# Patient Record
Sex: FEMALE | Race: WHITE
[De-identification: names, ages, dates, MRNs, and addresses within clinical notes are randomized per-mention and may not be internally consistent; named-entity substitution may affect disease eponyms.]

---

## 2017-01-20 ENCOUNTER — HOSPITAL ENCOUNTER (OUTPATIENT)
Dept: HOSPITAL 53 - M SFHCSACK | Age: 67
End: 2017-01-20
Attending: PHYSICIAN ASSISTANT
Payer: MEDICARE

## 2017-01-20 DIAGNOSIS — E55.9: ICD-10-CM

## 2017-01-20 DIAGNOSIS — D69.6: Primary | ICD-10-CM

## 2017-01-20 DIAGNOSIS — I10: ICD-10-CM

## 2017-01-20 DIAGNOSIS — E11.21: ICD-10-CM

## 2017-01-20 LAB
ALBUMIN SERPL BCG-MCNC: 3.3 GM/DL (ref 3.2–5.2)
ALBUMIN/GLOB SERPL: 1.03 {RATIO} (ref 1–1.93)
ALP SERPL-CCNC: 97 U/L (ref 45–117)
ALT SERPL W P-5'-P-CCNC: 19 U/L (ref 12–78)
ANION GAP SERPL CALC-SCNC: 8 MEQ/L (ref 8–16)
AST SERPL-CCNC: 18 U/L (ref 15–37)
BILIRUB SERPL-MCNC: 0.3 MG/DL (ref 0.2–1)
BUN SERPL-MCNC: 36 MG/DL (ref 7–18)
CALCIUM SERPL-MCNC: 8.5 MG/DL (ref 8.8–10.2)
CHLORIDE SERPL-SCNC: 111 MEQ/L (ref 98–107)
CHOLEST SERPL-MCNC: 133 MG/DL (ref ?–200)
CO2 SERPL-SCNC: 29 MEQ/L (ref 21–32)
CREAT SERPL-MCNC: 2.18 MG/DL (ref 0.55–1.02)
ERYTHROCYTE [DISTWIDTH] IN BLOOD BY AUTOMATED COUNT: 15.5 % (ref 11.5–14.5)
EST. AVERAGE GLUCOSE BLD GHB EST-MCNC: 157 MG/DL (ref 60–110)
GFR SERPL CREATININE-BSD FRML MDRD: 24.1 ML/MIN/{1.73_M2} (ref 45–?)
GLUCOSE SERPL-MCNC: 119 MG/DL (ref 80–110)
MCH RBC QN AUTO: 28 PG (ref 27–33)
MCHC RBC AUTO-ENTMCNC: 31.8 G/DL (ref 32–36.5)
MCV RBC AUTO: 88 FL (ref 80–96)
PLATELET # BLD AUTO: 88 K/MM3 (ref 150–450)
POTASSIUM SERPL-SCNC: 3.2 MEQ/L (ref 3.5–5.1)
PROT SERPL-MCNC: 6.5 GM/DL (ref 6.4–8.2)
SODIUM SERPL-SCNC: 148 MEQ/L (ref 136–145)
TRIGL SERPL-MCNC: 115 MG/DL (ref ?–150)
WBC # BLD AUTO: 6 K/MM3 (ref 4–10)

## 2017-01-28 NOTE — EDDOCDS
Nurse's Notes                                                                                     

[facility]                                                                         

Name: Kristie Jarrett                                                                               

Age: 66 yrs                                                                                       

Sex: Female                                                                                       

: 1950                                                                                   

MRN: [id number]                                                                                     

Arrival Date: 2017                                                                          

Time: 00:17                                                                                       

Account#: [id number]                                                                              

Bed Admit Hold                                                                                    

Private MD: Ottoniel Griggs P                                                                     

Diagnosis: Chronic combined systolic (congestive) and diastolic (congestive) heart failure;Chronic

obstructive pulmonary disease with (acute) exacerbation                                         

                                                                                                  

Presentation:                                                                                     

                                                                                             

00:29 Presenting complaint: Patient states: Over the last 3 days breathing has gotten         jp6 

      worse-,back pain and productive cough yellow sputum.Denies fever/chills. Adult Sepsis       

      Screening: The patient does not have new or worsening altered mentation. Patient has a      

      respiratory rate of greater than or equal to 22 (1 point). Systolic blood pressure is       

      greater than 100. Patient has a qSOFA score of 1- Negative Sepsis Screen.                   

      Suicide/Homicide risk assessment- the patient denies having any suicidal and/or             

      homicidal ideations and does not present with any other emotional, behavioral or mental     

      health complaints.  Status: Patient is not a  or              

      dependent. Transition of care: patient was not received from another setting of care.       

00:29 Acuity: SUZETTE Level 2                                                                     6 

00:29 Method Of Arrival: Ambulance                                                            jp6 

                                                                                                  

Triage Assessment:                                                                                

00:38 General: Appears distressed, ill, uncomfortable, Behavior is appropriate for age,       jp6 

      cooperative. Pain: Location: chest Pain currently is 4 out of 10 on a pain scale.           

      Quality of pain is described as aching. The patient is triaged at the bedside. See          

      Assessment in Nurses Notes section of ED record. Neurological: Level of Consciousness       

      is awake, alert, Oriented to person, place, time. EENT: No deficits noted.                  

      Cardiovascular: Capillary refill < 3 seconds Rhythm is sinus rhythm. Respiratory:           

      Onset: The symptoms/episode began/occurred 3 days, Airway is patent Respiratory effort      

      is labored, shallow, Respiratory pattern is regular, Breath sounds are coarse               

      inspiratory expiratory bilaterally. Breath sounds with rales Breath sounds with             

      rhonchi. GI: No deficits noted. : No deficits noted. Derm: Skin is pink, warm & dry.    

      Musculoskeletal: No deficits noted. Injury Description: No known injury.                    

                                                                                                  

Historical:                                                                                       

- Allergies: GABAPENTIN; Spironolactone; Vancomycin; Streptokinase; Mucinex;                      

- Home Meds:                                                                                      

1. Advair Diskus 250-50 mcg/dose Inhl dsdv 1 puff 2 times per day                               

2. albuterol sulfate 1.25 mg/3 mL Inhl nebu 3 mL 3-4 times daily                                

3. amlodipine 10 mg Oral tab 1 tab once daily                                                   

4. aspirin 325 mg Oral tab 1 tab once daily                                                     

5. atorvastatin 40 mg oral tab 1 tab once daily                                                 

6. calcium carbonate 650 mg calcium (1,625 mg) Oral tab daily                                   

7. carvedilol 25 mg oral tab 1 tab 2 times per day                                              

8. Drisdol 50,000 unit Oral cap 1 cap monthly                                                   

9. folic acid 1 mg Oral tab 1 tab once daily                                                    

10. Lasix 20 mg Oral tab 1 tab 2 times per day                                                  

11. levetiracetam 1,000 mg oral tab 1 tab every 12 hours                                        

12. nitroglycerin 0.6 mg SL subl 1 tab every 5 minutes                                          

13. Novolog 100 unit/mL Sub-Q soln sliding scale                                                

14. omeprazole 40 mg Oral cpDR 1 cap once daily                                                 

15. oxybutynin chloride 5 mg Oral tab 1 tab daily                                               

16. Toujeo SoloStar 300 unit/mL (1.5 mL) subcutaneous inpn 16 units BID                         

17. Prolia 60 mg/mL subcutaneous syrg 1 mL every 2 years                                        

18. oxycodone 5 mg Oral tab 1 tab every 4 hours                                                 

19. trazodone 100 mg Oral tab three times a day                                                 

20. pregabalin 50 mg Oral cap 1 cap twice a day                                                 

- PMHx: CAD; COPD; Diabetes - IDDM: controlled; Hypercholesterolemia; Hypertension;               

Kidney stones; MI; Renal Failure w/o Dialysis; Sleep Apnea w/ BiPap; vitreous                   

hemorrhage; CHF; chronic kidney disease;                                                        

- PSHx: triple bypass; ; Ankle Arthroplasty, Left; Appendectomy; Hysterectomy;           

Carpal Tunnel Repair- Bilateral;                                                                

- Social history: Smoking status: Patient/guardian denies using alcohol, street drugs,            

No barriers to communication noted, The patient speaks fluent English, states quit              

smoking yesterday.                                                                              

- Family history: Not pertinent.                                                                  

- : The pt / caregiver states he / she is not on anticoagulants. Home medication list             

is obtained from the patient.                                                                   

- Exposure Risk Screening:: None identified.                                                      

                                                                                                  

                                                                                                  

Screenin:45 Screening information is obtained from the patient. Fall risk: At risk due to age.      jp6 

      Assistance ADL's: requires no assistance with activities of daily living. Abuse/DV          

      Screen: The patient / caregiver reports he/she is: not in a situation that causes fear,     

      pain or injury. Nutritional screening: No deficits noted. Advance Directives:               

      Currently, there is no health care proxy. There is an active DNR order but there is no      

      copy available at this time. There is no living will. home support is adequate.             

                                                                                                  

Assessment:                                                                                       

00:45 General: see triage assessment.                                                         jp6 

01:44 Reassessment: pt states breathing is a little better.. Cardiovascular: No deficits      jp6 

      noted. Rhythm is regular. Respiratory: Airway is patent Respiratory effort is even,         

      labored, Respiratory pattern is regular, symmetrical, Breath sounds are coarse Breath       

      sounds with rhonchi inspiratory expiratory Breath sounds with wheezes.                      

01:44 Respiratory: Reports wears bipap w/ O2 at night.                                        jp6 

02:45 Reassessment: Patient denies pain at this time. General: Appears ill, Behavior is       jp6 

      appropriate for age, cooperative, pleasant. Pain: Denies pain. Neurological: No             

      deficits noted. Level of Consciousness is awake, alert, Oriented to person, place,          

      time. Cardiovascular: No deficits noted. Rhythm is regular. Respiratory: Airway is          

      patent Respiratory effort is even, unlabored. GI: No deficits noted. : No deficits        

      noted. Derm: Skin is pink, warm & dry. Musculoskeletal: No deficits noted.                

03:57 Reassessment: Patient appears in no apparent distress at this time. General: Appears in jp6 

      no apparent distress, to be sleeping. Behavior is appropriate for age, cooperative.         

      Pain: Denies pain. Neurological: No deficits noted. Level of Consciousness is obeys         

      commands, Oriented to person, place, time. EENT: No deficits noted. Cardiovascular: No      

      deficits noted. Rhythm is sinus rhythm No ectopy. Respiratory: Airway is patent             

      Respiratory effort is even, unlabored, Respiratory pattern is regular, symmetrical, pt      

      has a history of sleep apnea-sats dropped into 70's with cpap on. Respiratory in and        

      put non rebreather back on and sat's came up to 98%. GI: No deficits noted. : No          

      deficits noted. Derm: Skin is pink, warm & dry. Musculoskeletal: No deficits noted.       

05:01 Reassessment: Patient appears in no apparent distress at this time. Patient denies pain jp6 

      at this time. Neurological: No deficits noted. Level of Consciousness is awake, alert,      

      Oriented to person, place, time. Cardiovascular: No deficits noted. Rhythm is regular.      

      Respiratory: Airway is patent Respiratory effort is even, unlabored. Derm: Skin is          

      pink, warm & dry.                                                                         

05:58 Reassessment: Patient appears in no apparent distress at this time. pt is sleeping      jp6 

      comfortably with bipap on. VS are stable at 132/72 HR-68 O2 sat-94%..                       

07:15 General: First assessment of this patient. Patient unable to answer questions at this   hs1 

      time. Patient vital signs are stable. MAR received at this time and medications             

      administered.. Pain: Unable to use pain scale. patient not able to wake long enough for     

      conversation at this time. Patient appears extremely fatigued. Respiratory: Airway is       

      patent Patient on BiPAP at present. Oxygen Sats maintained and patient is being             

      monitored by respiratory. Derm: Skin is pink, warm & dry.                                 

07:45 Reassessment: Patient appears in no apparent distress at this time. no change in        hs1 

      patient status. Michael BERNAL aware of FS and order received to hold insulin at present.         

      Order also received to hold PO medications until patient is more awake. .                   

08:33 General: Appears in no apparent distress, Patient appears to be sleeping at present,    hs1 

      however continues to be not wakeful for long periods of time for conversation. Pt           

      extremely fatigued. .                                                                       

09:35 General: Appears ill, Behavior is appropriate for age, cooperative, drowsy.             kc3 

      Neurological: Level of Consciousness is lethargic, Oriented to person, place, time, Pt      

      wakes to name but is not wakeful for long periods of time. Pt almost immediately falls      

      back asleep. . Cardiovascular: Rhythm is sinus rhythm. Respiratory: Airway is patent        

      Respiratory effort is even, unlabored, Pt remains on bipap machine. Oxygen sats             

      maintained and patient is being monitored by respiratory. Derm: Skin is pink, warm &      

      dry.                                                                                        

10:20 General: Appears ill, Behavior is appropriate for age, cooperative, drowsy.             kc3 

      Neurological: Level of Consciousness is lethargic, Pt continues to wake to name but         

      does not maintain wakefulness for any length of time. Pt assisted to comfortable            

      position in bed. . Cardiovascular: Rhythm is sinus rhythm No ectopy. Respiratory:           

      Respiratory effort is even, unlabored, Bi-pap machine in place with respiratory             

      monitoring. Derm: Skin is pink, warm & dry.                                               

11:30 General: Appears ill, Behavior is appropriate for age, cooperative, drowsy. General: Pt kc3 

      assisted with bedpan and back to comfortable position in bed. . Pain: Location: back.       

      Neurological: Level of Consciousness is awake, lethargic, Oriented to person, place.        

      Cardiovascular: Rhythm is sinus rhythm. Respiratory: Airway is patent Respiratory           

      effort is even, unlabored, Bipap in place. Pt maintaining oxygen sats. Derm: Skin is        

      pink, warm & dry.                                                                         

                                                                                                  

Vital Signs:                                                                                      

00:38  / 59; Pulse 70; Resp 22; Temp 97.6(O); Pulse Ox 98% on R/A; Weight 63.5 kg;      jp6 

      Height 5 ft. 1 in. (154.94 cm); Pain 4/10;                                                  

00:45  / 59 (auto/);                                                                    jp6 

00:46 Pulse 70 MON; Pulse Ox 96% ;                                                            jp6 

01:00  / 62 (auto/);                                                                    jp6 

01:01 Pulse 71 MON; Pulse Ox 100% ;                                                           jp6 

01:15  / 59 (auto/);                                                                    jp6 

01:16 Pulse 72 MON; Pulse Ox 100% ;                                                           jp6 

01:30  / 65 (auto/);                                                                    jp6 

01:31 Pulse 68 MON; Pulse Ox 100% ;                                                           jp6 

01:47  / 63 (auto/);                                                                    jp6 

01:47 Pulse 69 MON; Pulse Ox 93% ;                                                            jp6 

02:00  / 60 (auto/);                                                                    jp6 

02:01 Pulse 68 MON; Pulse Ox 93% ;                                                            jp6 

02:15  / 65 (auto/);                                                                    jp6 

02:16 Pulse 70 MON; Pulse Ox 93% ;                                                            jp6 

02:30  / 61 (auto/);                                                                    jp6 

02:31 Pulse 69 MON; Pulse Ox 93% ;                                                            jp6 

02:45  / 74 (auto/);                                                                    jp6 

02:46 Pulse 70 MON; Pulse Ox 91% ;                                                            jp6 

03:00  / 61 (auto/);                                                                    jp6 

03:01 Pulse 71 MON; Pulse Ox 91% ;                                                            jp6 

03:15  / 65 (auto/);                                                                    jp6 

03:16 Pulse 70 MON; Pulse Ox 92% ;                                                            jp6 

03:30  / 65 (auto/);                                                                    jp6 

03:31 Pulse 70 MON; Pulse Ox 86% ;                                                            jp6 

03:45  / 64 (auto/);                                                                    jp6 

03:46 Pulse 68 MON; Pulse Ox 88% ;                                                            jp6 

03:53 Pulse 69 MON; Pulse Ox 98% ;                                                            jp6 

03:58 Pulse 68 MON; Pulse Ox 98% ;                                                            jp6 

04:00  / 64 (auto/);                                                                    jp6 

04:01 Pulse 68 MON; Pulse Ox 99% ;                                                            jp6 

04:15  / 65 (auto/);                                                                    jp6 

04:16 Pulse 68 MON; Pulse Ox 99% ;                                                            jp6 

04:30  / 69 (auto/);                                                                    jp6 

04:31 Pulse 70 MON; Pulse Ox 99% ;                                                            jp6 

04:45  / 67 (auto/);                                                                    jp6 

04:46 Pulse 70 MON; Pulse Ox 99% ;                                                            jp6 

05:00  / 67 (auto/);                                                                    jp6 

05:01 Pulse 70 MON; Pulse Ox 99% ;                                                            jp6 

05:15  / 66 (auto/);                                                                    jp6 

05:16 Pulse 71 MON; Pulse Ox 98% ;                                                            jp6 

05:30  / 70 (auto/);                                                                    jp6 

05:31 Pulse 70 MON; Pulse Ox 96% ;                                                            jp6 

05:45  / 72 (auto/);                                                                    jp6 

05:46 Pulse 69 MON; Pulse Ox 94% ;                                                            jp6 

06:00  / 73 (auto/);                                                                    jp6 

06:01 Pulse 68 MON; Pulse Ox 93% ;                                                            jp6 

06:15  / 68 (auto/);                                                                    jp6 

06:16 Pulse 66 MON; Pulse Ox 95% ;                                                            jp6 

06:26 Temp 97(T);                                                                             jp6 

06:30  / 73 (auto/);                                                                    jp6 

06:31 Pulse 67 MON; Pulse Ox 94% ;                                                            jp6 

06:45  / 69 (auto/);                                                                    jp6 

06:46 Pulse 65 MON; Pulse Ox 94% ;                                                            jp6 

07:00  / 71 (auto/);                                                                    kc3 

07:00 Pulse 66 MON; Pulse Ox 94% ;                                                            kc3 

07:15  / 78 (auto/);                                                                    kc3 

07:16 Pulse 70 MON; Pulse Ox 97% ;                                                            kc3 

07:30  / 70 (auto/);                                                                    kc3 

07:31 Pulse 70 MON; Pulse Ox 93% ;                                                            kc3 

07:45  / 68 (auto/);                                                                    kc3 

07:45 Pulse 70 MON; Pulse Ox 93% ;                                                            kc3 

08:00  / 68 (auto/);                                                                    kc3 

08:00 Pulse 70 MON; Pulse Ox 95% ;                                                            kc3 

08:15  / 68 (auto/);                                                                    kc3 

08:15 Pulse 68 MON; Pulse Ox 96% ;                                                            kc3 

08:30  / 71 (auto/);                                                                    kc3 

08:30 Pulse 69 MON; Pulse Ox 96% ;                                                            kc3 

08:45  / 67 (auto/);                                                                    kc3 

08:45 Pulse 69 MON; Pulse Ox 95% ;                                                            kc3 

09:00  / 68 (auto/);                                                                    kc3 

09:01 Pulse 69 MON; Pulse Ox 96% ;                                                            kc3 

09:15  / 68 (auto/);                                                                    kc3 

09:16 Pulse 69 MON; Pulse Ox 94% ;                                                            kc3 

09:30  / 99 (auto/);                                                                    kc3 

09:31 Pulse 73 MON; Pulse Ox 95% ;                                                            kc3 

09:45  / 71 (auto/);                                                                    kc3 

09:45 Pulse 71 MON; Pulse Ox 93% ;                                                            kc3 

10:00  / 77 (auto/);                                                                    kc3 

10:00 Pulse 69 MON; Pulse Ox 94% ;                                                            kc3 

10:15  / 71 (auto/);                                                                    kc3 

10:15 Pulse 70 MON; Resp 22; Temp 99.4(TE); Pulse Ox 93% ;                                    kc3 

10:30  / 70 (auto/);                                                                    kc3 

10:31 Pulse 72 MON; Pulse Ox 95% ;                                                            kc3 

10:45  / 76 (auto/);                                                                    kc3 

10:46 Pulse 74 MON; Pulse Ox 95% ;                                                            kc3 

11:00  / 70 (auto/);                                                                    kc3 

11:01 Pulse 70 MON; Pulse Ox 96% ;                                                            kc3 

11:15  / 59 (auto/);                                                                    kc3 

11:16 Pulse 74 MON; Pulse Ox 96% ;                                                            kc3 

11:30  / 70 (auto/);                                                                    kc3 

11:30 Pulse 69 MON; Resp 24; Temp 98.8(TE); Pulse Ox 98% ;                                    kc3 

00:38 Body Mass Index 26.45 (63.50 kg, 154.94 cm)                                             jp6 

10:15 pt on bipap                                                                             kc3 

11:30 pt on bipap                                                                             kc3 

                                                                                                  

Vitals:                                                                                           

00:38 Log In Time N/A - ambulance arrival.                                                    jp6 

                                                                                                  

ED Course:                                                                                        

00:18 Patient visited by Jeanine Tillman, Unit Clerk.                                        jlm 

00:18 Ottoniel Griggs is Private Physician.                                                    jlm 

00:18 Fredy Garnett DO is Attending Physician.                                               cs11

00:18 Patient visited by Fredy Garnett DO.                                                   cs11

00:18 Patient moved to [facility] 

00:29 Chelsi Fregoso,RN is Primary Nurse.                                                     jp6 

00:31 Triage Initiated                                                                        jp6 

00:45 The patient / caregiver is instructed regarding the plan of care and ED course. Cardiac jp6 

      monitor on. Pulse ox on. NIBP on.                                                           

00:45 Maintain field IV. Dressing intact. Good blood return noted. Site clean & dry. Gauge &  jp6

      site: 18 gauge left forearm. IV is patent, is intact, is free of redness or swelling.       

      No procedures done that require assistance. Labs/Blood culture drawn.                       

00:47 -Arterial Blood Gas Sent.                                                               jc3 

00:47 O2 via non-rebreather \T\ 15L/min.                                                        jp6 

01:05 Patient visited by Andrea Coyle PCA.                                                     jmv 

01:05 EKG done. (by ED staff). Reviewed by Fredy Garnett DO.                                   jmv 

01:16 -Blood Culture Sent.                                                                    jp6 

01:47 O2 via nasal cannula \T\ 4L/min.                                                          jp6 

02:02 [facility] Payment Agreement was scanned into [employer] and attached to record.               hs2 

02:10 Patient visited by Chelsi Fregoso RN.                                                   jp6 

02:20 Lakshmi Anglin is Hospitalizing Provider.                                             cs11

03:00 Patient placed on CPAP sat's not staying up on Cpap.                                    jp6 

05:09 ARTERIAL BLOOD GAS Sent.                                                                jc3 

06:20 -Arterial Blood Gas Sent.                                                               jc3 

06:26 BIPAP: Full face fask.                                                                  jp6 

06:37 Patient moved to Admit Hold                                                             [id number]

08:06 Written Provider Order was scanned into [employer]IND[employer] and attached to record.                 deg 

08:27 Primary Nurse role handed off by Chelsi Fregoso,RN                                      jjr 

09:02 T-Sheet-- Draft Copy was scanned into [employer] and attached to record.                   seh 

                                                                                                  

Administered Medications:                                                                         

00:35 Drug: Albuterol-Ipratropium 1 neb [ipratropium-albuterol 0.5 mg-3 mg(2.5 mg base)/3 mL  jc3 

      nebulization soln (1 neb)] Route: Nebulizer;                                                

00:45 Drug: Albuterol-Ipratropium 1 neb [ipratropium-albuterol 0.5 mg-3 mg(2.5 mg base)/3 mL  jc3 

      nebulization soln (1 neb)] Route: Nebulizer;                                                

00:54 Drug: Ondansetron 4 mg [ondansetron HCl 2 mg/mL intravenous solution (2 mL)] Route:     jp6 

      IVP; Site: left forearm;                                                                    

00:55 Drug: Albuterol-Ipratropium 1 neb [ipratropium-albuterol 0.5 mg-3 mg(2.5 mg base)/3 mL  jc3 

      nebulization soln (1 neb)] Route: Nebulizer;                                                

01:15 Drug: Furosemide 60 mg [furosemide 10 mg/mL injection solution (6 mL)] Route: IVP;      jp6 

      Site: left forearm;                                                                         

01:16 Drug: Dexamethasone 15 mg [dexamethasone 4 mg/mL injection solution] Route: IV; Rate:   jp6 

      bolus; Site: left forearm;                                                                  

01:16 Drug: Nitro-Bid 0.5 inches [Nitro-Bid 2 % transdermal ointment (0.5 inches)] Route:     jp6 

      Transdermal; Site: anterior chest wall;                                                     

                                                                                                  

                                                                                                  

Output:                                                                                           

06:26 Urine: 0.00ml; Total: 0.00ml.                                                           jp6 

                                                                                                  

RT:                                                                                               

00:35 Initial Med Neb Given as ordered. O2 via non-rebreather \T\ 15L/min. Respiratory: Airway  jc3 

      is patent Respiratory effort is labored, Use of accessory muscles noted. Respiratory        

      pattern is tachypnea Breath sounds are diminished bilaterally. Breath sounds with           

      wheezes at expiration.                                                                      

00:41 ABG's drawn from right radial artery pressure held for 5 minutes no bleeding noted      jc3 

      pressure bandage applied specimen sent pt. tolerated well.                                  

00:54 Subsequent Med Neb Given as ordered Patient tolerated procedure well without adverse    jc3 

      effect. O2 via Pre ABG, Patient on Room Air. SpO2 - 97%. Oxygen is room air.                

00:59 Subsequent Med Neb Given as ordered Patient tolerated procedure well without adverse    jc3 

      effect. Respiratory: Breath sounds are coarse Breath sounds are diminished bilaterally.     

      Breath sounds with wheezes at expiration.                                                   

03:49 O2 via non-rebreather \T\ 15L/min.                                                        jc3 

03:49 O2 via Patient placed on her own CPAP with O2. SpO2 would not go above 86% with 15L O2  jc3 

      inline. Put patient on Non-rebreather.                                                      

05:09 ABG's drawn from right brachial artery pressure held for 5 minutes no bleeding noted    jc3 

      pressure bandage applied specimen sent pt. tolerated well. O2 via non-rebreather \T\          

      15L/min.                                                                                    

05:11 O2 via Venturi mask \T\ 15L/min - 50%.                                                    jc3 

05:32 BIPAP: Inspiratory Pressure: 16, Expiratory Pressure: 8, Backup Rate: 8, FiO2: 40%,     jc3 

      Full face fask.                                                                             

06:20 ABG's drawn from left radial artery pressure held for 5 minutes no bleeding noted       jc3 

      pressure bandage applied specimen sent pt. tolerated well.                                  

                                                                                                  

Order Results:                                                                                    

Lab Order: -Arterial Blood Gas; SPEC'M 17 00:45                                             

      Test: ABG pH (ARTERIAL); Value: 7.257; Range: 7.350-7.450; Abnormal: Below low normal;      

      Units: UNITS; Status: F                                                                     

      Test: ABG PARTIAL PRESSURE CO2; Value: 57.4; Range: 35.0-45.0; Abnormal: Above high         

      normal; Units: mmHg; Status: F                                                              

      Test: ABG PARTIAL PRESSURE O2; Value: 168.2; Range: 75.0-100.0; Abnormal: Above high        

      normal; Units: mmHg; Status: F                                                              

      Test: ABG TOTAL CO2; Value: 26.8; Range: 23.0-31.0; Units: MEQ/L; Status: F                 

      Test: ABG HCO3; Value: 25.0; Range: 22.0-26.0; Units: MEQ/L; Status: F                      

      Test: ABG BASE EXCESS; Value: -2.7; Range: -2.0-2.0; Abnormal: Below low normal;            

      Status: F                                                                                   

      Test: ABG STANDARD HCO3; Value: 22.2; Range: 22.0-26.0; Units: MEQ/L; Status: F             

      Test: ABG O2 SATURATION; Value: 99.0; Range: 95.0-99.0; Units: %; Status: F                 

      Test: ABG DEVICE; Value: NASAL PEACE; Status: F                                              

Lab Order: Lactic Acid (Gray tube on ice); SPEC'M 17 00:31                                  

      Test: LACTIC ACID LEVEL, LACTATE; Value: 0.4; Range: 0.4-2.0; Units: MMOL/L; Status: F      

Lab Order: CBC with Diff; SPEC'M 17 00:31                                                   

      Test: WHITE BLOOD COUNT; Value: 6.4; Range: 4.0-10.0; Units: K/mm3; Status: F               

      Test: RED BLOOD COUNT; Value: 4.09; Range: 4.00-5.40; Units: M/mm3; Status: F               

      Test: HEMOGLOBIN; Value: 11.1; Range: 12.0-16.0; Abnormal: Below low normal; Units:         

      g/dl; Status: F                                                                             

      Test: HEMATOCRIT; Value: 36.3; Range: 36.0-47.0; Units: %; Status: F                        

      Test: MEAN CORPUSCULAR VOLUME; Value: 88.7; Range: 80.0-96.0; Units: fl; Status: F          

      Test: MEAN CORPUSCULAR HEMOGLOBIN; Value: 27.2; Range: 27.0-33.0; Units: pg; Status: F      

      Test: MEAN CORPUSCULAR HGB CONC; Value: 30.7; Range: 32.0-36.5; Abnormal: Below low         

      normal; Units: g/dl; Status: F                                                              

      Test: RED CELL DISTRIBUTION WIDTH; Value: 14.7; Range: 11.5-14.5; Abnormal: Above high      

      normal; Units: %; Status: F                                                                 

      Test: PLATELET COUNT, AUTOMATED; Value: 77; Range: 150-450; Abnormal: Below low normal;     

      Units: k/mm3; Status: F                                                                     

      Test: NEUTROPHILS %; Value: 76.8; Range: 36.0-66.0; Abnormal: Above high normal; Units:     

      %; Status: F                                                                                

      Test: LYMPH %; Value: 11.1; Range: 24.0-44.0; Abnormal: Below low normal; Units: %;         

      Status: F                                                                                   

      Test: MONO %; Value: 7.0; Range: 0.0-5.0; Abnormal: Above high normal; Units: %;            

      Status: F                                                                                   

      Test: EOS %; Value: 2.1; Range: 0.0-3.0; Units: %; Status: F                                

      Test: BASO %; Value: 0.4; Range: 0.0-1.0; Units: %; Status: F                               

      Test: LARGE UNSTAINED CELL %; Value: 2.6; Range: 0.0-4.0; Units: %; Status: F               

      Test: NEUTROPHILS #; Value: 4.9; Range: 1.8-7.7; Units: K/mm3; Status: F                    

      Test: LYMPH #; Value: 0.7; Range: 1.5-4.5; Abnormal: Below low normal; Units: K/mm3;        

      Status: F                                                                                   

      Test: MONO #; Value: 0.5; Range: 0.0-0.8; Units: K/mm3; Status: F                           

      Test: EOS #; Value: 0.1; Range: 0.0-0.50; Units: K/mm3; Status: F                           

      Test: BASO #; Value: 0.0; Range: 0.0-0.2; Units: K/mm3; Status: F                           

      Test: LARGE UNSTAINED CELL #; Value: 0.2; Range: 0.0-0.4; Units: K/mm3; Status: F           

Lab Order: MED Profile; SPEC'M 17 00:31                                                     

      Test: GLUCOSE, FASTING; Value: 79; Range: ; Abnormal: Below low normal; Units:        

      MG/DL; Status: F                                                                            

      Test: BLOOD UREA NITROGEN; Value: 36; Range: 7-18; Abnormal: Above high normal; Units:      

      MG/DL; Status: F                                                                            

      Test: CREATININE FOR GFR; Value: 2.42; Range: 0.55-1.02; Abnormal: Above high normal;       

      Units: MG/DL; Status: F                                                                     

      Test: GLOMERULAR FILTRATION RATE; Value: 21.4; Range: >45; Abnormal: Below low normal;      

      Status: F                                                                                   

      Test: SODIUM LEVEL; Value: 146; Range: 136-145; Abnormal: Above high normal; Units:         

      MEQ/L; Status: F                                                                            

      Test: POTASSIUM SERUM; Value: 3.6; Range: 3.5-5.1; Units: MEQ/L; Status: F                  

      Test: CHLORIDE LEVEL; Value: 108; Range: ; Abnormal: Above high normal; Units:        

      MEQ/L; Status: F                                                                            

      Test: CARBON DIOXIDE LEVEL; Value: 31; Range: 21-32; Units: MEQ/L; Status: F                

      Test: ANION GAP; Value: 7; Range: 8-16; Abnormal: Below low normal; Units: MEQ/L;           

      Status: F                                                                                   

      Test: CALCIUM LEVEL; Value: 8.7; Range: 8.8-10.2; Abnormal: Below low normal; Units:        

      MG/DL; Status: F                                                                            

      Test Note: &nbsp;; Units are mL/min/1.73 m2 Chronic Kidney Disease Staging per NKF:       

      Stage I & II GFR >=60 Normal to Mildly Decreased Stage III GFR 30-59 Moderately           

      Decreased Stage IV GFR 15-29 Severely Decreased Stage V GFR <15 Very Little GFR Left        

      ESRD GFR <15 on RRT                                                                         

Lab Order: Cardiac Marker Panel; SPEC' 17 00:31                                            

      Test: CPK CREATINE PHOSPHOKINASE; Value: 91; Range: ; Units: U/L; Status: F           

      Test: CK-MB VALUE MASS; Value: 2.2; Range: 0.0-3.6; Units: NG/ML; Status: F                 

      Test: MB/CK RELATIVE INDEX; Value: 2.41; Range: < OR =4; Status: F                          

      Test: TROPONIN I; Value: < 0.02; Range: < 0.10; Units: NG/ML; Status: F                     

      Test Note: &nbsp;; DIAGNOSIS CRITERIA MMB ng/ml Relative Index (RI) NON-AMI < or = 5      

      N/A GRAY ZONE > 5 < or = 4 AMI > 5 > 4                                                      

Lab Order: BNP; SPEC' 17 00:31                                                             

      Test: BRAIN NATRIURETIC PEPTIDE; Value: 2210; Range: <100; Abnormal: Above high normal;     

      Units: PG/ML; Status: F                                                                     

Lab Order: CARDIAC MARKER PANEL; SPEC' 17 06:16                                            

      Test: CPK CREATINE PHOSPHOKINASE; Value: 76; Range: ; Units: U/L; Status: F           

      Test: CK-MB VALUE MASS; Value: 1.7; Range: 0.0-3.6; Units: NG/ML; Status: F                 

      Test: MB/CK RELATIVE INDEX; Value: 2.23; Range: < OR =4; Status: F                          

      Test: TROPONIN I; Value: < 0.02; Range: < 0.10; Units: NG/ML; Status: F                     

      Test Note: &nbsp;; DIAGNOSIS CRITERIA MMB ng/ml Relative Index (RI) NON-AMI < or = 5      

      N/A GRAY ZONE > 5 < or = 4 AMI > 5 > 4                                                      

Lab Order: ARTERIAL BLOOD GAS; SPEC' 17 04:55                                              

      Test: ABG pH (ARTERIAL); Value: 7.208; Range: 7.350-7.450; Abnormal: Critical Low;          

      Units: UNITS; Status: F                                                                     

      Test: ABG PARTIAL PRESSURE CO2; Value: 71.3; Range: 35.0-45.0; Abnormal: Above upper        

      panic limits; Units: mmHg; Status: F                                                        

      Test: ABG PARTIAL PRESSURE O2; Value: 98.7; Range: 75.0-100.0; Units: mmHg; Status: F       

      Test: ABG TOTAL CO2; Value: 29.9; Range: 23.0-31.0; Units: MEQ/L; Status: F                 

      Test: ABG HCO3; Value: 27.7; Range: 22.0-26.0; Abnormal: Above high normal; Units:          

      MEQ/L; Status: F                                                                            

      Test: ABG BASE EXCESS; Value: -1.4; Range: -2.0-2.0; Status: F                              

      Test: ABG STANDARD HCO3; Value: 23.3; Range: 22.0-26.0; Units: MEQ/L; Status: F             

      Test: ABG O2 SATURATION; Value: 96.5; Range: 95.0-99.0; Units: %; Status: F                 

Lab Order: -Arterial Blood Gas; SPEC' 17 06:20                                             

      Test: ABG pH (ARTERIAL); Value: 7.213; Range: 7.350-7.450; Abnormal: Critical Low;          

      Units: UNITS; Status: F                                                                     

      Test: ABG PARTIAL PRESSURE CO2; Value: 66.2; Range: 35.0-45.0; Abnormal: Above upper        

      panic limits; Units: mmHg; Status: F                                                        

      Test: ABG PARTIAL PRESSURE O2; Value: 84.9; Range: 75.0-100.0; Units: mmHg; Status: F       

      Test: ABG TOTAL CO2; Value: 28.1; Range: 23.0-31.0; Units: MEQ/L; Status: F                 

      Test: ABG HCO3; Value: 26.1; Range: 22.0-26.0; Abnormal: Above high normal; Units:          

      MEQ/L; Status: F                                                                            

      Test: ABG BASE EXCESS; Value: -2.8; Range: -2.0-2.0; Abnormal: Below low normal;            

      Status: F                                                                                   

      Test: ABG STANDARD HCO3; Value: 22.1; Range: 22.0-26.0; Units: MEQ/L; Status: F             

      Test: ABG O2 SATURATION; Value: 94.8; Range: 95.0-99.0; Abnormal: Below low normal;         

      Units: %; Status: F                                                                         

      Test: ABG DEVICE; Value: NASAL PEACE; Status: F                                              

Lab Order: ARTERIAL BLOOD GAS; SPEC'M 17 10:02                                              

      Test: ABG pH (ARTERIAL); Value: 7.253; Range: 7.350-7.450; Abnormal: Below low normal;      

      Units: UNITS; Status: F                                                                     

      Test: ABG PARTIAL PRESSURE CO2; Value: 54.0; Range: 35.0-45.0; Abnormal: Above high         

      normal; Units: mmHg; Status: F                                                              

      Test: ABG PARTIAL PRESSURE O2; Value: 79.7; Range: 75.0-100.0; Units: mmHg; Status: F       

      Test: ABG TOTAL CO2; Value: 25.0; Range: 23.0-31.0; Units: MEQ/L; Status: F                 

      Test: ABG HCO3; Value: 23.3; Range: 22.0-26.0; Units: MEQ/L; Status: F                      

      Test: ABG BASE EXCESS; Value: -4.2; Range: -2.0-2.0; Abnormal: Below low normal;            

      Status: F                                                                                   

      Test: ABG STANDARD HCO3; Value: 20.9; Range: 22.0-26.0; Abnormal: Below low normal;         

      Units: MEQ/L; Status: F                                                                     

      Test: ABG O2 SATURATION; Value: 94.8; Range: 95.0-99.0; Abnormal: Below low normal;         

      Units: %; Status: F                                                                         

Lab Order: Fingerstick Blood Sugar; SPEC'M 17 07:50                                         

      Test: BEDSIDE GLUCOSE; Value: 125; Range: ; Abnormal: Above high normal; Units:       

      MG/DL; Status: F                                                                            

      Test Note: &nbsp;; Insulin Coverage Ord RN Notified                                       

                                                                                                  

Outcome:                                                                                          

02:20 Decision to Hospitalize by Provider.                                                    cs11

10:24 No special radiology studies were completed.                                            kc3 

11:32 Discharge Assessment: patient administered narcotics - no. The following High Risk      3 

      Discharge criteria are identified: None. Admitted to ICU accompanied by nurse,              

      accompanied by tech, via stretcher, with oxygen, on monitor, with chart. critical.          

      Admission hand-off: Report called to Marco ICU RN. Property :Personal belongings             

      accompany Pt.                                                                               

11:35 Patient left the ED.                                                                    3 

                                                                                                  

Signatures:                                                                                       

Sabiha Olivarez, Unit Clerk              Unit deg                                                  

Kinza Perea, RN                     RN   kmg1                                                 

Chelsi Villa, RN                    RN   Rohit Harrington                               jc3                                                  

Ragini Wright, RN                    RN   hs1                                                  

Fredy Garnett,                        DO   cs11                                                 

Jeanine Tillman, Unit Clerk            Unit jlDonya Taylor,RN                          RN   kc3                                                  

Christa Barrera, Reg                   Reg  hs2                                                  

Chelsi Fregoso,RN                       RN   jp6                                                  

Basia Chicas Jose, FELIPA                         PCA  jmv                                                  

                                                                                                  

**************************************************************************************************

[email or web address]

## 2017-01-28 NOTE — HPEPDOC
General


Date of Admission


17


Chief Complaint


The patient is a 66-year-old female admitted with a reason for visit of Diff 

Breathing.





History of Present Illness


Kristie Jarrett is a 66 year old  female with a pertinent history of 3 

vessel CABG, COPD, IDDM, stage 4 CKD, HTN, tobacco abuse, and ALONZO managed by 2L 

CPAP at night, presenting with progressively worsening shortness of breath over 

1 week. 2 days ago, pt was seen at [facility] because of shortness of breath 

on exertion and chest pressure. Pt reports getting IV furosemide and being 

discharged to home. Last night, the pt felt acute onset of dizziness and 

shortness of breath and was brought in by ambulance to [facility] ED. She can 

typically go to the bathroom or do the dishes without any dyspnea but is now 

experiencing dyspnea while speaking. She also has an associated productive 

cough over the last week that produces yellow phlegm. Pt also reports chest 

discomfort prior to calling the ambulance and describes it as a knot under the 

sternum that does not radiate. She vomited once last night and has not 

experienced N/V since. Pt has orthopnea. Pt denies any fever, chills, change in 

vision, h/a, dysuria, urinary frequency or urgency, or melena. 





ED course: After arrival to the ED, pt received Furosemide 60mg IV, ondansetron 

4mg IV, nebulizer treatments, and Dexamethasone 15 mg. Pt reports feeling much 

better after ED treatment.





Home Medications


Scheduled


(Toujeo Solostar) 300 Unit/Ml Inj 18 UNIT SC BID  (Reported) 


Amlodipine Besylate (Amlodipine Besylate) 10 Mg Tab 20 MG PO QHS  (Reported) 


Aspirin (Aspirin EC) 325 Mg Tabec 325 MG PO DAILY  (Reported) 


Atorvastatin Calcium (Atorvastatin Calcium) 40 Mg Tab 40 MG PO DAILY  (Reported

) 


Carvedilol (Carvedilol) 25 Mg Tab 25 MG PO BID  (Reported) 


Ergocalciferol (Vitamin D) 50,000 Unit Cap 50,000 UNIT PO MTHLY  (Reported) 


   takes in the beginning of each month 


Folic Acid (Folic Acid) 1 Mg Tab 1 MG PO DAILY  (Reported) 


Furosemide (Furosemide) 20 Mg Tab 20 MG PO BID  (Reported) 


Insulin Aspart (Novolog) 100 U/Ml Inj 1 DOSE SC ACHS  (Reported) 


   PER HOME SLIDING SCALE 


Levetiracetam (Keppra) 1,000 Mg Tab 1,000 MG PO BID  (Reported) 


Omeprazole (Omeprazole) 40 Mg Cap 40 MG PO DAILY  (Reported) 


Oxybutynin Chloride (Oxybutynin Chloride) 5 Mg Tab 5 MG PO DAILY  (Reported) 


Pregabalin (Lyrica) 50 Mg Cap 50 MG PO BID  (Reported) 


Salmeterol/Fluticasone (Advair Diskus 250-50 Mcg/Dose) 14 Puff/Inhaler Aerp 1 

PUFF INH BID  (Reported) 


Trazodone HCl (Trazodone HCl) 100 Mg Tab 100 MG PO QHS  (Reported) 





Scheduled PRN


Albuterol Sulfate (Ventolin Hfa) 200 Puff/8 Gm Aers 2 PUFF INH Q4H PRN PRN 

SHORTNESS OF BREATH (Reported) 


Albuterol Sulfate (Albuterol Sulfate) 2.5 Mg/3 Ml Nebu 2.5 MG INH Q4H PRN PRN 

SOB/WHEEZING (Reported) 


Nitroglycerin (Nitrostat) 0.4 Mg Subl 0.4 MG SL NITRO PRN PRN ANGINA (Reported) 





Allergies


Coded Allergies:  


     Streptokinase (Unverified  Allergy, Severe, BLOOD CLOTS, 3/30/16)


     Amiloride (Verified  Allergy, Intermediate, RASH/GENERAL ITCHING, 13)


 RASH/GENERAL ITCHING


     Guaifenesin & Derivatives (Unverified  Allergy, Intermediate, FACE SWELLING

, 3/30/16)


     Shellfish Allergy (Verified  Allergy, Intermediate, RASH, 13)


     Spironolactone (Unverified  Allergy, Intermediate, RASH, 13)


     Vancomycin (Unverified  Allergy, Intermediate, HIVES,SWELLING,RASH, 13)


     Gabapentin (Verified  Adverse Reaction, Severe, SUICIDAL IDEATIONS, 13)





Past Medical History


Medical History


3 vessel CABG post MI , COPD, IDDM, dyslipidemia, CKD stage 4, HTN, ALONZO


Surgical History


, Appendectomy, Tonsillectomy, CABG, Cardiac stents, hysterectomy, 

back fusion, carpal tunnel release on left, b/l ankle surgery, left hip surgery

, left leg open reduction and internal fixation





Family History


Significant Family History:  Noncontributory





Social History


* Smoker:  current smoker (1/2PPD)


Alcohol:  denies


Drugs:  denies


Recent Travel/Sick Contacts:  Denies: Recent sick contacts, Recent travel


Psychosocial History:  No pertinent psych hx





Review of Symptoms


Constitutional:  Reports: Chills (states she always gets chills), Night Sweats (

has had night sweats for 6mo-1year), Weakness, 


   Denies: Fever


Eyes:  Reports: Vision change (blurry vision over the past month but no acute 

change), 


   Denies: Pain


ENT:  Denies: Dysphagia, Head Aches


Skin:  Denies: Lesions, Rash


Pulmonary:  Reports: Cough (productive of yellow phelgm), Dyspnea


Cardiovascular:  Reports: Chest Pain, Lt Headedness, Orthopnea, 


   Denies: Palpitations


Gastrointestinal:  Reports: Vomiting (Once last night), 


   Denies: Abdominal Pain, Constipation, Diarrhea, Nausea


Genitourinary:  Denies: Dysuria, Frequency, Incontinence


Psych:  Reports: Mood Normal





Physical Examination


General Exam:  Positive: Alert, Cooperative, Moderate Distress


Eye Exam:  Positive: Conjunctiva & lids normal, EOMI, PERRLA, 


   Negative: Sclera icteric


ENT Exam:  Positive: Atraumatic, Mucous membr. moist/pink, Pharynx Normal


Neck Exam:  Positive: Supple, 


   Negative: JVD, thyromegaly


Chest Exam:  Positive: Diminished, Other (Crackles heard throughout all lung 

fields), Rhonchi, Wheezing


Heart Exam:  Positive: Normal S1, Normal S2, Rate Normal, Regular Rhythm, 


   Negative: Murmurs, Rubs


Abdomen Exam:  Positive: Hepatospenomegaly (Liver edge palpated approx. 3 cm 

below ribs.), Normal bowel sounds, Soft, 


   Negative: Mass, Tenderness


Extremity Exam:  Positive: Normal pulses, 


   Negative: Clubbing, Cyanosis, Edema


Skin Exam:  Positive: Nl turgor and temperature





Vital Signs


/63, P 69 RR 22 97.6F 93% R/A


Height (in):  61


Weight (kg):  63.5





Laboratory Data


Labs 24H


 Laboratory Tests 2


17 00:31: 


Anion Gap 7L, B-Type Natriuretic Peptide 2210H, White Blood Count 6.4, Red 

Blood Count 4.09, Hemoglobin 11.1L, Hematocrit 36.3, Mean Corpuscular Volume 

88.7, Mean Corpuscular Hemoglobin 27.2, Mean Corpuscular Hemoglobin Concent 

30.7L, Red Cell Distribution Width 14.7H, Platelet Count 77L, Neutrophils (%) (

Auto) 76.8H, Lymphocytes (%) (Auto) 11.1L, Monocytes (%) (Auto) 7.0H, 

Eosinophils (%) (Auto) 2.1, Basophils (%) (Auto) 0.4, Neutrophils # (Auto) 4.9, 

Lymphocytes # (Auto) 0.7L, Monocytes # (Auto) 0.5, Eosinophils # (Auto) 0.1, 

Basophils # (Auto) 0.0, Blood Urea Nitrogen 36H, Creatinine 2.42H, Sodium Level 

146H, Potassium Level 3.6, Chloride Level 108H, Carbon Dioxide Level 31, 

Calcium Level 8.7L, Total Creatine Kinase 91, Creatine Kinase MB 2.2, Creatine 

Kinase MB Relative Index 2.41, Glomerular Filtration Rate 21.4L, Lactic Acid 

Level 0.4, Large Unclassified Cells # 0.2, Large Unclassified Cells % 2.6, 

Troponin I < 0.02


17 00:45: 


Arterial Blood pH 7.257L, Arterial Blood Partial Pressure CO2 57.4H, Arterial 

Blood Partial Pressure O2 168.2H, Arterial Blood Total CO2 26.8, Arterial Blood 

HCO3 25.0, Arterial Blood Base Excess -2.7L, Arterial Blood Oxygen Saturation 

99.0, Blood Gas Bicarbonate Standard 22.2, Oxygen Delivery Device NASAL PEACE


CBC/BMP


 Laboratory Tests


17 00:31








Calcium Level 8.7 L, Total Creatine Kinase 91, Red Blood Count 4.09, Mean 

Corpuscular Volume 88.7, Mean Corpuscular Hemoglobin 27.2, Mean Corpuscular 

Hemoglobin Concent 30.7 L, Red Cell Distribution Width 14.7 H, Neutrophils (%) (

Auto) 76.8 H, Lymphocytes (%) (Auto) 11.1 L, Monocytes (%) (Auto) 7.0 H, 

Eosinophils (%) (Auto) 2.1, Basophils (%) (Auto) 0.4, Neutrophils # (Auto) 4.9, 

Lymphocytes # (Auto) 0.7 L, Monocytes # (Auto) 0.5, Eosinophils # (Auto) 0.1, 

Basophils # (Auto) 0.0


Microbiology





 Microbiology


17 Blood Culture, Received


          Pending








(1) COPD exacerbation


Status:  Acute


Assessment & Plan:  Pt has dyspnea at rest, crackles throughout lung fields, 

and chest discomfort. Pt has a CXR that shows bilateral interstitial 

infiltrates and Kerley B lines. Suspect COPD exacerbation is secondary to 

exacerbation of CHF. Although pt has a productive cough over the past week, it 

is less likely the etiology of her COPD exacerbation is due to infection 

because her WBC is not elevated and pt is currently afebrile.  However, due to 

the pt's extensive history of COPD, we will start systemic steroids, nebulizer 

treatments, and levaquin. Although pt has stage 4 CKD, she is currently on 20mg 

furosemide PO BID at home. Will give furosemide 40mg IV BID. Will monitor BMP 

daily. Pt's O2 saturation dropped to 85% after she fell asleep despite having 

her home nasal CPAP machine on 15L of oxygen. Repeat ABG worsened, we will 

place her on BiPAP and we have already spoken to Dr. Cummings who will manage. 

Plan to keep pt's O2 saturation between 88-92%. B/C is pending. Patient appears 

to be a mouth breather and will need a full face CPAP mask, as opposed to the 

nasal pillows she has been using.





(2) Acute on chronic diastolic CHF (congestive heart failure)


Status:  Acute


Assessment & Plan:  Pt has an extensive cardiac history with 3 vessel CABG s/p 

MI in , HTN, and IDDM, and is presenting with dyspnea at rest and chest 

discomfort for several days. Pt had a BRANNON last year which showed a LVEF of 50-55

%, but suggested potential diastolic heart failure and pulmonary HTN. 

Furthermore, there is evidence that her home CPAP has not been working as 

intended. This in combination with pt's current BNP of 2210 and physical exam 

findings is concerning for an acute exacerbation of CHF. At this time, we will 

get an ECG, trend cardiac markers Q8, diurese her with furosemide IV 40mg BID 

and reevaluate in the AM. Will also repeat an echocardiogram, last one 

performed 2016.





(3) Chronic kidney disease (CKD) stage G4/A1, severely decreased glomerular 

filtration rate (GFR) between 15-29 mL/min/1.73 square meter and albuminuria 

creatinine ratio less than 30 mg/g


Status:  Chronic


Assessment & Plan:  Pt's baseline creatinine is between 2-3. Pt is currently at 

2.42. We are concerned about the potential effects of increasing her diuretic 

dose but feel it is necessary because of her severe dyspnea. Because we do not 

suspect she currently has an PRINCESS as the pt's creatinine is well within her 

baseline, we will continue to monitor BMP in AM. It is possible creatinine will 

improve after diuresis due to improved hemodynamics. 





(4) Insulin dependent diabetes mellitus


Status:  Chronic


Assessment & Plan:  Pt reports home glucose readings of 100-127. Will place pt 

on sliding scale insulin.





(5) Obstructive sleep apnea


Status:  Chronic


Assessment & Plan:  Pt brought in CPAP from home. Respiratory observed that pt'

s home CPAP was not effective, as she is a mouth breather and has been using 

nasal pillows.  O2 sat on home CPAP was only 85% and her ABG worsened.  Will 

bipap patient and keep O2 saturation at 88-92%.





(6) Thrombocytopenia


Status:  Chronic


Assessment & Plan:  Pt has a platelet count of 77 today. However, after 

reviewing the pt's laboratory history, it appears she is chronically 

thrombocytopenic. Will continue to monitor platelets daily and transfuse if 

count drops below 20. 





(7) Cirrhosis of liver


Status:  Chronic


Assessment & Plan:  continue current management





(8) Gastroesophageal reflux disease


Status:  Chronic


Assessment & Plan:  Continue current PPI





(9) History of seizures


Status:  Chronic


Assessment & Plan:  Continue current management





(10) Dyslipidemia


Status:  Chronic


Assessment & Plan:  continue current management





(11) DVT prophylaxis


Status:  Acute


Assessment & Plan:  she will receive TEDs and SEQs due to her low platelet 

count 





Plan / VTE


VTE Prophylaxis Ordered?:  Yes (TEDs & Seqs)





GME ATTESTATION


GME ATTESTATION


My preceptor for this patient encounter was physically present in the building 

during the encounter and was fully available. As needed, all aspects of the 

patient interview, examination, medical decision making process, and medical 

care plan development were reviewed and approved by the preceptor. Preceptor is 

aware and concurs with the plan as stated in the body of this note and will 

attest to such by his/her cosignature.





ATTENDING NOTE


I, Lakshmi Anglin, have seen and examined the above patient and agree with the 

assessment and plan as documented by Dr. Almodovar. The patient will be admitted 

as an inpatient on the service of Dr. Rodriguez.








MARY GRACE ALMODOVAR DO 2017 04:41


LAKSHMI ANGLIN 2017 06:53

## 2017-01-28 NOTE — EDDOCDS
Physician Documentation                                                                           

[facility]                                                                         

Name: Kristie Jarrett                                                                               

Age: 66 yrs                                                                                       

Sex: Female                                                                                       

: 1950                                                                                   

MRN: [id number]                                                                                     

Arrival Date: 2017                                                                          

Time: 00:17                                                                                       

Account#: [id number]                                                                              

Bed Admit Hold                                                                                    

Private MD: Ottoniel Griggs P                                                                     

Disposition:                                                                                      

17 02:20 Hospitalization ordered by Lakshmi Anglin for Inpatient Admission.               

Preliminary diagnosis are Chronic combined systolic (congestive) and                            

diastolic (congestive) heart failure, Chronic obstructive pulmonary disease                     

with (acute) exacerbation.                                                                      

- Bed requested for M ICU.                                                                        

- Status is Inpatient Admission.                                                              kc3 

- Condition is Stable.                                                                            

- Problem is chronic.                                                                             

- Symptoms have improved.                                                                         

                                                                                                  

                                                                                                  

                                                                                                  

Historical:                                                                                       

- Allergies: GABAPENTIN; Spironolactone; Vancomycin; Streptokinase; Mucinex;                      

- Home Meds:                                                                                      

1. Advair Diskus 250-50 mcg/dose Inhl dsdv 1 puff 2 times per day                               

2. albuterol sulfate 1.25 mg/3 mL Inhl nebu 3 mL 3-4 times daily                                

3. amlodipine 10 mg Oral tab 1 tab once daily                                                   

4. aspirin 325 mg Oral tab 1 tab once daily                                                     

5. atorvastatin 40 mg oral tab 1 tab once daily                                                 

6. calcium carbonate 650 mg calcium (1,625 mg) Oral tab daily                                   

7. carvedilol 25 mg oral tab 1 tab 2 times per day                                              

8. Drisdol 50,000 unit Oral cap 1 cap monthly                                                   

9. folic acid 1 mg Oral tab 1 tab once daily                                                    

10. Lasix 20 mg Oral tab 1 tab 2 times per day                                                  

11. levetiracetam 1,000 mg oral tab 1 tab every 12 hours                                        

12. nitroglycerin 0.6 mg SL subl 1 tab every 5 minutes                                          

13. Novolog 100 unit/mL Sub-Q soln sliding scale                                                

14. omeprazole 40 mg Oral cpDR 1 cap once daily                                                 

15. oxybutynin chloride 5 mg Oral tab 1 tab daily                                               

16. Toujeo SoloStar 300 unit/mL (1.5 mL) subcutaneous inpn 16 units BID                         

17. Prolia 60 mg/mL subcutaneous syrg 1 mL every 2 years                                        

18. oxycodone 5 mg Oral tab 1 tab every 4 hours                                                 

19. trazodone 100 mg Oral tab three times a day                                                 

20. pregabalin 50 mg Oral cap 1 cap twice a day                                                 

- PMHx: CAD; COPD; Diabetes - IDDM: controlled; Hypercholesterolemia; Hypertension;               

Kidney stones; MI; Renal Failure w/o Dialysis; Sleep Apnea w/ BiPap; vitreous                   

hemorrhage; CHF; chronic kidney disease;                                                        

- PSHx: triple bypass; ; Ankle Arthroplasty, Left; Appendectomy; Hysterectomy;           

Carpal Tunnel Repair- Bilateral;                                                                

- Social history: Smoking status: Patient/guardian denies using alcohol, street drugs,            

No barriers to communication noted, The patient speaks fluent English, states quit              

smoking yesterday.                                                                              

- Family history: Not pertinent.                                                                  

- : The pt / caregiver states he / she is not on anticoagulants. Home medication list             

is obtained from the patient.                                                                   

- Exposure Risk Screening:: None identified.                                                      

                                                                                                  

                                                                                                  

Vital Signs:                                                                                      

                                                                                             

00:38  / 59; Pulse 70; Resp 22; Temp 97.6(O); Pulse Ox 98% on R/A; Weight 63.5 kg /     jp6 

      139.99 lbs; Height 5 ft. 1 in. (154.94 cm); Pain 4/10;                                      

00:45  / 59 (auto/);                                                                    jp6 

00:46 Pulse 70 MON; Pulse Ox 96% ;                                                            jp6 

01:00  / 62 (auto/);                                                                    jp6 

01:01 Pulse 71 MON; Pulse Ox 100% ;                                                           jp6 

01:15  / 59 (auto/);                                                                    jp6 

01:16 Pulse 72 MON; Pulse Ox 100% ;                                                           jp6 

01:30  / 65 (auto/);                                                                    jp6 

01:31 Pulse 68 MON; Pulse Ox 100% ;                                                           jp6 

01:47  / 63 (auto/);                                                                    jp6 

01:47 Pulse 69 MON; Pulse Ox 93% ;                                                            jp6 

02:00  / 60 (auto/);                                                                    jp6 

02:01 Pulse 68 MON; Pulse Ox 93% ;                                                            jp6 

02:15  / 65 (auto/);                                                                    jp6 

02:16 Pulse 70 MON; Pulse Ox 93% ;                                                            jp6 

02:30  / 61 (auto/);                                                                    jp6 

02:31 Pulse 69 MON; Pulse Ox 93% ;                                                            jp6 

02:45  / 74 (auto/);                                                                    jp6 

02:46 Pulse 70 MON; Pulse Ox 91% ;                                                            jp6 

03:00  / 61 (auto/);                                                                    jp6 

03:01 Pulse 71 MON; Pulse Ox 91% ;                                                            jp6 

03:15  / 65 (auto/);                                                                    jp6 

03:16 Pulse 70 MON; Pulse Ox 92% ;                                                            jp6 

03:30  / 65 (auto/);                                                                    jp6 

03:31 Pulse 70 MON; Pulse Ox 86% ;                                                            jp6 

03:45  / 64 (auto/);                                                                    jp6 

03:46 Pulse 68 MON; Pulse Ox 88% ;                                                            jp6 

03:53 Pulse 69 MON; Pulse Ox 98% ;                                                            jp6 

03:58 Pulse 68 MON; Pulse Ox 98% ;                                                            jp6 

04:00  / 64 (auto/);                                                                    jp6 

04:01 Pulse 68 MON; Pulse Ox 99% ;                                                            jp6 

04:15  / 65 (auto/);                                                                    jp6 

04:16 Pulse 68 MON; Pulse Ox 99% ;                                                            jp6 

04:30  / 69 (auto/);                                                                    jp6 

04:31 Pulse 70 MON; Pulse Ox 99% ;                                                            jp6 

04:45  / 67 (auto/);                                                                    jp6 

04:46 Pulse 70 MON; Pulse Ox 99% ;                                                            jp6 

05:00  / 67 (auto/);                                                                    jp6 

05:01 Pulse 70 MON; Pulse Ox 99% ;                                                            jp6 

05:15  / 66 (auto/);                                                                    jp6 

05:16 Pulse 71 MON; Pulse Ox 98% ;                                                            jp6 

05:30  / 70 (auto/);                                                                    jp6 

05:31 Pulse 70 MON; Pulse Ox 96% ;                                                            jp6 

05:45  / 72 (auto/);                                                                    jp6 

05:46 Pulse 69 MON; Pulse Ox 94% ;                                                            jp6 

06:00  / 73 (auto/);                                                                    jp6 

06:01 Pulse 68 MON; Pulse Ox 93% ;                                                            jp6 

06:15  / 68 (auto/);                                                                    jp6 

06:16 Pulse 66 MON; Pulse Ox 95% ;                                                            jp6 

06:26 Temp 97(T);                                                                             jp6 

06:30  / 73 (auto/);                                                                    jp6 

06:31 Pulse 67 MON; Pulse Ox 94% ;                                                            jp6 

06:45  / 69 (auto/);                                                                    jp6 

06:46 Pulse 65 MON; Pulse Ox 94% ;                                                            jp6 

07:00  / 71 (auto/);                                                                    kc3 

07:00 Pulse 66 MON; Pulse Ox 94% ;                                                            kc3 

07:15  / 78 (auto/);                                                                    kc3 

07:16 Pulse 70 MON; Pulse Ox 97% ;                                                            kc3 

07:30  / 70 (auto/);                                                                    kc3 

07:31 Pulse 70 MON; Pulse Ox 93% ;                                                            kc3 

07:45  / 68 (auto/);                                                                    kc3 

07:45 Pulse 70 MON; Pulse Ox 93% ;                                                            kc3 

08:00  / 68 (auto/);                                                                    kc3 

08:00 Pulse 70 MON; Pulse Ox 95% ;                                                            kc3 

08:15  / 68 (auto/);                                                                    kc3 

08:15 Pulse 68 MON; Pulse Ox 96% ;                                                            kc3 

08:30  / 71 (auto/);                                                                    kc3 

08:30 Pulse 69 MON; Pulse Ox 96% ;                                                            kc3 

08:45  / 67 (auto/);                                                                    kc3 

08:45 Pulse 69 MON; Pulse Ox 95% ;                                                            kc3 

09:00  / 68 (auto/);                                                                    kc3 

09:01 Pulse 69 MON; Pulse Ox 96% ;                                                            kc3 

09:15  / 68 (auto/);                                                                    kc3 

09:16 Pulse 69 MON; Pulse Ox 94% ;                                                            kc3 

09:30  / 99 (auto/);                                                                    kc3 

09:31 Pulse 73 MON; Pulse Ox 95% ;                                                            kc3 

09:45  / 71 (auto/);                                                                    kc3 

09:45 Pulse 71 MON; Pulse Ox 93% ;                                                            kc3 

10:00  / 77 (auto/);                                                                    kc3 

10:00 Pulse 69 MON; Pulse Ox 94% ;                                                            kc3 

10:15  / 71 (auto/);                                                                    kc3 

10:15 Pulse 70 MON; Resp 22; Temp 99.4(TE); Pulse Ox 93% ;                                    kc3 

10:30  / 70 (auto/);                                                                    kc3 

10:31 Pulse 72 MON; Pulse Ox 95% ;                                                            kc3 

10:45  / 76 (auto/);                                                                    kc3 

10:46 Pulse 74 MON; Pulse Ox 95% ;                                                            kc3 

11:00  / 70 (auto/);                                                                    kc3 

11:01 Pulse 70 MON; Pulse Ox 96% ;                                                            kc3 

11:15  / 59 (auto/);                                                                    kc3 

11:16 Pulse 74 MON; Pulse Ox 96% ;                                                            kc3 

11:30  / 70 (auto/);                                                                    kc3 

11:30 Pulse 69 MON; Resp 24; Temp 98.8(TE); Pulse Ox 98% ;                                    kc3 

00:38 Body Mass Index 26.45 (63.50 kg, 154.94 cm)                                             jp6 

10:15 pt on bipap                                                                             kc3 

11:30 pt on bipap                                                                             kc3 

                                                                                                  

MDM:                                                                                              

00:42 Call Respiratory ordered.                                                               cs11

00:42 -Blood Culture (Adults Only), peripheral from different site, or from device/port/PICC  cs11

      etc. if present ordered.                                                                    

00:42 IV Saline Lock ordered.                                                                 cs11

00:42 Dexamethasone 15 mg IV at bolus once ordered.                                           cs11

00:42 Nitro-Bid Ointment 2 % 0.5 inches Transdermal once ordered.                             cs11

00:43 Furosemide 60 mg IVP once ordered.                                                      cs11

00:44 Chest, 1 View Ordered.                                                                  EDMS

00:44 ECG WITH READING ER PHYS+CARDIAG ordered.                                               EDMS

00:44 -Arterial Blood Gas Ordered.                                                            EDMS

00:44 Lactic Acid (Gray tube on ice) Ordered.                                                 EDMS

00:44 CBC with Diff Ordered.                                                                  EDMS

00:44 MED Profile Ordered.                                                                    EDMS

00:44 Cardiac Marker Panel Ordered.                                                           EDMS

00:44 BNP Ordered.                                                                            EDMS

00:44 -Blood Culture Ordered.                                                                 EDMS

00:54 Call Respiratory complete.                                                              jp6 

00:54 Ondansetron 4 mg IVP once ordered.                                                      jp6 

01:11 -Arterial Blood Gas Reviewed.                                                           cs11

01:16 -Blood Culture (Adults Only), peripheral from different site, or from device/port/PICC  jp6 

      etc. if present complete.                                                                   

01:26 MED Profile Reviewed.                                                                   cs11

01:26 Lactic Acid (Gray tube on ice) Reviewed.                                                cs11

01:26 Cardiac Marker Panel Reviewed.                                                          cs11

01:38 BED REQUEST+ADM ordered.                                                                EDMS

01:48 Financial registration complete.                                                        hs2 

02:02 [facility] Payment Agreement was scanned into [employer] and attached to record.               hs2 

02:16 Albuterol-Ipratropium 1 neb Nebulizer every 20 minutes x3 ordered.                      cs11

02:16 Call Respiratory ordered.                                                               cs11

02:16 CBC with Diff Reviewed.                                                                 cs11

02:16 BNP Reviewed.                                                                           cs11

02:22 Call Respiratory complete.                                                              jlm 

04:01 Admission / Observation Status ordered.                                                 EDMS

04:01 ECHOCARD,DOPPLER/COLOR FLOW ordered.                                                    EDMS

04:01 NO ADDED SALT DIET ordered.                                                             EDMS

04:01 CARDIAC MARKER PANEL Ordered.                                                           EDMS

04:02 ARTERIAL BLOOD GAS Ordered.                                                             EDMS

04:02 BIPAP INPATIENT ordered.                                                                EDMS

05:23 Admission / Observation Status ordered.                                                 EDMS

06:16 -Arterial Blood Gas Ordered.                                                            EDMS

07:45 ARTERIAL BLOOD GAS Ordered.                                                             EDMS

08:06 Written Provider Order was scanned into [employer] and attached to record.                 deg 

08:09 Fingerstick Blood Sugar Ordered.                                                        EDMS

09:02 T-Sheet-- Draft Copy was scanned into [employer] and attached to record.                   [facility] 

10:32 MRSA SCREEN Ordered.                                                                    EDMS

                                                                                                  

Administered Medications:                                                                         

00:35 Drug: Albuterol-Ipratropium 1 neb [ipratropium-albuterol 0.5 mg-3 mg(2.5 mg base)/3 mL  jc3 

      nebulization soln (1 neb)] Route: Nebulizer;                                                

00:45 Drug: Albuterol-Ipratropium 1 neb [ipratropium-albuterol 0.5 mg-3 mg(2.5 mg base)/3 mL  jc3 

      nebulization soln (1 neb)] Route: Nebulizer;                                                

00:54 Drug: Ondansetron 4 mg [ondansetron HCl 2 mg/mL intravenous solution (2 mL)] Route:     jp6 

      IVP; Site: left forearm;                                                                    

00:55 Drug: Albuterol-Ipratropium 1 neb [ipratropium-albuterol 0.5 mg-3 mg(2.5 mg base)/3 mL  jc3 

      nebulization soln (1 neb)] Route: Nebulizer;                                                

01:15 Drug: Furosemide 60 mg [furosemide 10 mg/mL injection solution (6 mL)] Route: IVP;      jp6 

      Site: left forearm;                                                                         

01:16 Drug: Dexamethasone 15 mg [dexamethasone 4 mg/mL injection solution] Route: IV; Rate:   jp6 

      bolus; Site: left forearm;                                                                  

01:16 Drug: Nitro-Bid 0.5 inches [Nitro-Bid 2 % transdermal ointment (0.5 inches)] Route:     jp6 

      Transdermal; Site: anterior chest wall;                                                     

                                                                                                  

                                                                                                  

Signatures:                                                                                       

Dispatcher [employer]                           EDMS                                                 

Sabiha Olivarez, Unit Clerk              Unit deg                                                  

Taylor Castillo RN RN dls Schiff, Craig, DO                       DO   cs11                                                 

Jeanine Tillman, Unit Clerk            Unit jlDonya Taylor,RN                          RN   kc3                                                  

Christa Barrera, Reg                   Reg  hs2                                                  

Chelsi Fregoso,RN                       RN   jp6                                                  

Basia Chicas Joseph jc3                                                  

                                                                                                  

The chart was reviewed and I authenticate all verbal orders and agree with the evaluation and 
treatment provided.Attachments:

02:02 [facility] Payment Agreement                                                                hs2 

08:06 Written Provider Order                                                                  deg 

09:02 T-Sheet-- Draft Copy                                                                    [facility] 

                                                                                                  

**************************************************************************************************

MTDD

## 2017-01-29 NOTE — IPNPDOC
Assessment/Plan


Date Seen


The patient was seen on 1/29/17.





Problems


Problems:  


(1) Acute on chronic diastolic CHF (congestive heart failure)


Status:  Acute


Problem Text:  Pt has an extensive cardiac history with 3 vessel CABG s/p MI in 

2012


BRANNON last year revealed a LVEF of 50-55%, with diastolic heart failure and 

pulmonary HTN.


Cont ASA, Coreg, Statin


Repeat echocardiogram pending


IV Lasix transitioned to PO Lasix


Patient states that her respiratory status has improved


Daily weights


Strict I/O's


Will continue to monitor





(2) COPD exacerbation


Status:  Acute


Problem Text:  ABG noted to be improved since admission


She has been receiving BiPAP therapy overnight


Cont Advair, Nebs


Steroids transitioned to PO Prednisone


Will continue to monitor respiratory status





(3) Chronic kidney disease (CKD) stage G4/A1, severely decreased glomerular 

filtration rate (GFR) between 15-29 mL/min/1.73 square meter and albuminuria 

creatinine ratio less than 30 mg/g


Status:  Chronic


Response to Treatment:  Stable


Problem Text:  Pt's baseline creatinine is between 2-3. 


Cont Lasix therapy as prescribed





(4) Insulin dependent diabetes mellitus


Status:  Chronic


Problem Text:  Cont current Insulin regimen





(5) Obstructive sleep apnea


Status:  Chronic


Problem Text:  Pt on CPAP at home.


She has been requiring BiPAP therapy here for hypercarbic respiratory failure 2/

2 CHF exacerbation 





(6) Thrombocytopenia


Status:  Chronic


Problem Text:  It appears she is chronically thrombocytopenic. 


Will continue to monitor platelets daily and transfuse if count drops below 20. 





(7) Cirrhosis of liver


Status:  Chronic


Problem Text:  continue current management





(8) Gastroesophageal reflux disease


Status:  Chronic


Problem Text:  Continue current PPI





(9) History of seizures


Status:  Chronic


Problem Text:  Continue current management





(10) Dyslipidemia


Status:  Chronic


Problem Text:  continue current management





(11) DVT prophylaxis


Status:  Acute


Problem Text:  she will receive TEDs and SEQs due to her low platelet count 








Plan / VTE


VTE Prophylaxis Ordered?:  Yes (TEDs & Seqs)





Subjective


Review of Systems


CC/HPI


The patient is a 66-year-old female admitted with a reason for visit of A/C 

Resp Failure; A On C Diastolic Chf.


General:  Denies: Chills, Night Sweats


Constitutional:  Denies: Chills, Fever


Eyes:  Denies: Pain, Vision change


ENT:  Denies: Ear Pain, Head Aches


Skin:  Denies: Lesions, Rash


Pulmonary:  Reports: Cough, Dyspnea


Cardiovascular:  Denies: Chest Pain, Palpitations


Gastrointestinal:  Denies: Nausea, Vomiting


Genitourinary:  Denies: Dysuria, Frequency


Hematologic:  Denies: Bleeding Excessively, Bruising





Objective


Physical Examination


General Exam:  Positive: Alert, Cooperative, Moderate Distress


Eye Exam:  Positive: Conjunctiva & lids normal, EOMI, PERRLA, 


   Negative: Sclera icteric


ENT Exam:  Positive: Atraumatic, Mucous membr. moist/pink, Pharynx Normal


Neck Exam:  Positive: Supple, 


   Negative: JVD, thyromegaly


Chest Exam:  Positive: Diminished, Other (Crackles heard throughout all lung 

fields), Rhonchi, Wheezing


Heart Exam:  Positive: Normal S1, Normal S2, Rate Normal, Regular Rhythm, 


   Negative: Murmurs, Rubs


Abdomen Exam:  Positive: Hepatospenomegaly (Liver edge palpated approx. 3 cm 

below ribs.), Normal bowel sounds, Soft, 


   Negative: Mass, Tenderness


Extremity Exam:  Positive: Normal pulses, 


   Negative: Clubbing, Cyanosis, Edema


Skin Exam:  Positive: Nl turgor and temperature





Vital Signs/I&O





 Vital Signs








  Date Time  Temp Pulse Resp B/P Pulse Ox O2 Delivery O2 Flow Rate FiO2


 


1/29/17 09:00  69      


 


1/29/17 08:00      BIPAP/CPAP  40


 


1/29/17 06:00   21 141/71 97   


 


1/29/17 04:00 96.9       


 


1/28/17 21:00       5.0 














 I&O- Last 24 Hours up to 6 AM 


 


 1/29/17





 06:00


 


Intake Total 420 ml


 


Output Total 525 ml


 


Balance -105 ml











Laboratory Data


Labs 24H


 Laboratory Tests 2


1/28/17 14:12: 


Creatine Kinase MB 1.3, Creatine Kinase MB Relative Index 1.96, Total Creatine 

Kinase 66, Troponin I < 0.02


1/28/17 17:32: Bedside Glucose (Misc Panel) 322H


1/28/17 17:56: 


Arterial Blood pH 7.303L, Arterial Blood Partial Pressure CO2 53.5H, Arterial 

Blood Partial Pressure O2 76.3, Arterial Blood Total CO2 27.6, Arterial Blood 

HCO3 25.9, Arterial Blood Base Excess -0.9, Arterial Blood Oxygen Saturation 

94.2L, Blood Gas Bicarbonate Standard 23.7


1/28/17 20:21: Bedside Glucose (Misc Panel) 327H


1/28/17 21:55: 


Creatine Kinase MB 1.4, Creatine Kinase MB Relative Index 2.41, Total Creatine 

Kinase 58, Troponin I < 0.02


1/29/17 04:16: 


Anion Gap 6L, White Blood Count 3.2L, Red Blood Count 3.69L, Hemoglobin 9.9L, 

Hematocrit 32.1L, Mean Corpuscular Volume 87.1, Mean Corpuscular Hemoglobin 

26.9L, Mean Corpuscular Hemoglobin Concent 30.9L, Red Cell Distribution Width 

15.6H, Platelet Count 67L, Neutrophils (%) (Auto) 81.8H, Lymphocytes (%) (Auto) 

10.5L, Monocytes (%) (Auto) 5.4H, Eosinophils (%) (Auto) 0.3, Basophils (%) (

Auto) 0.8, Neutrophils # (Auto) 2.6, Lymphocytes # (Auto) 0.4L, Monocytes # (

Auto) 0.2, Eosinophils # (Auto) 0.0, Basophils # (Auto) 0.0, Blood Urea 

Nitrogen 53H, Creatinine 2.89H, Sodium Level 137#, Potassium Level 4.3, 

Chloride Level 100, Carbon Dioxide Level 31, Calcium Level 8.1L, Glomerular 

Filtration Rate 17.4L, Large Unclassified Cells # 0.0, Large Unclassified Cells 

% 1.3, Magnesium Level 2.1


1/29/17 05:37: 


Arterial Blood pH 7.321L, Arterial Blood Partial Pressure CO2 51.2H, Arterial 

Blood Partial Pressure O2 92.6, Arterial Blood Total CO2 27.4, Arterial Blood 

HCO3 25.8, Arterial Blood Base Excess -0.7, Arterial Blood Oxygen Saturation 

96.6, Blood Gas Bicarbonate Standard 23.9


CBC/BMP


 Laboratory Tests


1/29/17 04:16








Calcium Level 8.1 L, Red Blood Count 3.69 L, Mean Corpuscular Volume 87.1, Mean 

Corpuscular Hemoglobin 26.9 L, Mean Corpuscular Hemoglobin Concent 30.9 L, Red 

Cell Distribution Width 15.6 H, Neutrophils (%) (Auto) 81.8 H, Lymphocytes (%) (

Auto) 10.5 L, Monocytes (%) (Auto) 5.4 H, Eosinophils (%) (Auto) 0.3, Basophils 

(%) (Auto) 0.8, Neutrophils # (Auto) 2.6, Lymphocytes # (Auto) 0.4 L, Monocytes 

# (Auto) 0.2, Eosinophils # (Auto) 0.0, Basophils # (Auto) 0.0


FSBS





Laboratory Tests








Test


  1/28/17


17:32 1/28/17


20:21 Range/Units


 


 


Bedside Glucose (Misc Panel) 322 327   MG/DL








Microbiology





 Microbiology


1/28/17 Blood Culture - Preliminary, Resulted


          No growth after 24 hours . All specim...


1/28/17 MRSA Screen - Final, Complete


          Staph.aureus Methicillin Resis








LAWRENCE COMER MD Jan 29, 2017 11:05

## 2017-01-29 NOTE — ECGEPIP
Stationary ECG Study

                           [facility] - ED

                                       

                                       Test Date:    2017

Pat Name:     MORRIS ESPITIA            Department:   

Patient ID:   [id number]                 Room:         [place] [address]

Gender:       F                        Technician:   [email or web address]

:          1950               Requested By: ANDREW LANDRY 

Order Number: [id number]     Reading MD:   Basia Galindo

                                 Measurements

Intervals                              Axis          

Rate:         69                       P:            38

CT:           162                      QRS:          15

QRSD:         125                      T:            128

QT:           445                                    

QTc:          477                                    

                           Interpretive Statements

SINUS RHYTHM WITH OCCASIONAL VENTRICULAR PREMATURE COMPLEXES

LATERAL MYOCARDIAL INFARCTION, OF INDETERMINATE AGE

INFERIOR MYOCARDIAL INFARCTION, PROBABLY OLD

SIMILAR 16

Electronically Signed On 2017 15:32:14 EST by Basia Galindo

## 2017-01-30 NOTE — REP
Portable chest, 01/28/2017, 12:57 a.m., single frontal view, patient sitting:

 

Comparison is 11/12/2016.

 

Sternotomy wires and cardiomegaly are again noted, unchanged.

 

The lung fields are clear.  The linda, mediastinum, and bony thorax are

unremarkable.

 

Impression:

 

Chronic cardiomegaly.  No acute cardiopulmonary findings.

 

 

Signed by

Lewis Davis MD 01/28/2017 08:06 A

## 2017-01-30 NOTE — IPNPDOC
Assessment/Plan


Date Seen


The patient was seen on 1/30/17.





Problems


Problems:  


(1) Acute on chronic diastolic CHF (congestive heart failure)


Status:  Acute


Response to Treatment:  Improving


Problem Text:  Pt has an extensive cardiac history with 3 vessel CABG s/p MI in 

2012


BRANNON last year revealed a LVEF of 50-55%, with diastolic heart failure and 

pulmonary HTN.


Cont ASA, Coreg, Statin


Repeat echocardiogram notable for preserved EF, Stage 1 DD, mod-severe MR, mod 

pulm htn


IV Lasix transitioned to PO Lasix


Patient states that her respiratory status has improved


Daily weights


Strict I/O's


Will continue to monitor





(2) COPD exacerbation


Status:  Acute


Response to Treatment:  Improving


Problem Text:  ABG noted to be improved since admission


She has been receiving BiPAP therapy overnight


Cont Advair, Nebs


Steroids transitioned to PO Prednisone


Will continue to monitor respiratory status


Pulmonary on board


BiPAP therapy as per Pulmonary





(3) Chronic kidney disease (CKD) stage G4/A1, severely decreased glomerular 

filtration rate (GFR) between 15-29 mL/min/1.73 square meter and albuminuria 

creatinine ratio less than 30 mg/g


Status:  Chronic


Response to Treatment:  Stable


Problem Text:  Pt's baseline creatinine is between 2-3. 


Cont Lasix therapy as prescribed





(4) Insulin dependent diabetes mellitus


Status:  Chronic


Problem Text:  Cont current Insulin regimen





(5) Obstructive sleep apnea


Status:  Chronic


Problem Text:  Pt on CPAP at home.


She has been requiring BiPAP therapy here for hypercarbic respiratory failure 2/

2 CHF exacerbation 





(6) Thrombocytopenia


Status:  Chronic


Problem Text:  It appears she is chronically thrombocytopenic. 


Will continue to monitor platelets daily and transfuse if count drops below 20. 





(7) Cirrhosis of liver


Status:  Chronic


Problem Text:  continue current management





(8) Gastroesophageal reflux disease


Status:  Chronic


Problem Text:  Continue current PPI





(9) History of seizures


Status:  Chronic


Problem Text:  Continue current management





(10) Dyslipidemia


Status:  Chronic


Problem Text:  continue current management





(11) DVT prophylaxis


Status:  Acute


Problem Text:  she will receive TEDs and SEQs due to her low platelet count 








Plan / VTE


VTE Prophylaxis Ordered?:  Yes (TEDs & Seqs)





Disposition


Pending Clinical Improvement. PT eval. Will continue to down titrate oxygen 

requirement as tolerated, optimize respiratory status





Subjective


Review of Systems


CC/HPI


The patient is a 66-year-old female admitted with a reason for visit of A/C 

Resp Failure; A On C Diastolic Chf.


General:  Denies: Chills, Night Sweats


Constitutional:  Denies: Chills, Fever


Eyes:  Denies: Pain, Vision change


ENT:  Denies: Ear Pain, Head Aches


Skin:  Denies: Lesions, Rash


Pulmonary:  Reports: Cough, Dyspnea


Cardiovascular:  Denies: Chest Pain, Palpitations


Gastrointestinal:  Denies: Nausea, Vomiting


Hematologic:  Denies: Bleeding Excessively, Bruising


Musculoskeletal:  Denies: Back Pain, Neck Pain





Objective


Physical Examination


General Exam:  Positive: Alert, Cooperative, Moderate Distress


Eye Exam:  Positive: Conjunctiva & lids normal, EOMI, PERRLA, 


   Negative: Sclera icteric


ENT Exam:  Positive: Atraumatic, Mucous membr. moist/pink, Pharynx Normal


Neck Exam:  Positive: Supple, 


   Negative: JVD, thyromegaly


Chest Exam:  Positive: Diminished, Other (Crackles heard throughout all lung 

fields), Rhonchi, Wheezing


Heart Exam:  Positive: Normal S1, Normal S2, Rate Normal, Regular Rhythm, 


   Negative: Murmurs, Rubs


Abdomen Exam:  Positive: Hepatospenomegaly (Liver edge palpated approx. 3 cm 

below ribs.), Normal bowel sounds, Soft, 


   Negative: Mass, Tenderness


Extremity Exam:  Positive: Normal pulses, 


   Negative: Clubbing, Cyanosis, Edema


Skin Exam:  Positive: Nl turgor and temperature





Vital Signs/I&O





 Vital Signs








  Date Time  Temp Pulse Resp B/P Pulse Ox O2 Delivery O2 Flow Rate FiO2


 


1/30/17 10:48   25  93   


 


1/30/17 08:29    149/70    


 


1/30/17 08:00 96.3 65    Nasal Cannula 2.0 


 


1/30/17 04:37        40














 I&O- Last 24 Hours up to 6 AM 


 


 1/30/17





 06:00


 


Intake Total 1960 ml


 


Output Total 1725 ml


 


Balance 235 ml











Laboratory Data


Labs 24H


 Laboratory Tests 2


1/29/17 12:18: Bedside Glucose (Misc Panel) 355H


1/29/17 17:30: Bedside Glucose (Misc Panel) 170H


1/29/17 20:05: Bedside Glucose (Misc Panel) 201H


1/30/17 04:21: 


Anion Gap 7L, White Blood Count 5.2, Red Blood Count 3.85L, Hemoglobin 10.3L, 

Hematocrit 33.3L, Mean Corpuscular Volume 86.5, Mean Corpuscular Hemoglobin 

26.9L, Mean Corpuscular Hemoglobin Concent 31.1L, Red Cell Distribution Width 

14.6H, Platelet Count 81L, Neutrophils (%) (Auto) 81.7H, Lymphocytes (%) (Auto) 

9.8L, Monocytes (%) (Auto) 6.6H, Eosinophils (%) (Auto) 0.0, Basophils (%) (Auto

) 0.0, Neutrophils # (Auto) 4.2, Lymphocytes # (Auto) 0.5L, Monocytes # (Auto) 

0.3, Eosinophils # (Auto) 0.0, Basophils # (Auto) 0.0, Blood Urea Nitrogen 73H, 

Creatinine 2.80H, Sodium Level 140, Potassium Level 3.8, Chloride Level 103, 

Carbon Dioxide Level 30, Calcium Level 8.3L, Glomerular Filtration Rate 18.1L, 

Large Unclassified Cells # 0.1, Large Unclassified Cells % 1.9, Magnesium Level 

2.2


1/30/17 07:20: Bedside Glucose (Misc Panel) 178H


CBC/BMP


 Laboratory Tests


1/30/17 04:21








Calcium Level 8.3 L, Red Blood Count 3.85 L, Mean Corpuscular Volume 86.5, Mean 

Corpuscular Hemoglobin 26.9 L, Mean Corpuscular Hemoglobin Concent 31.1 L, Red 

Cell Distribution Width 14.6 H, Neutrophils (%) (Auto) 81.7 H, Lymphocytes (%) (

Auto) 9.8 L, Monocytes (%) (Auto) 6.6 H, Eosinophils (%) (Auto) 0.0, Basophils (

%) (Auto) 0.0, Neutrophils # (Auto) 4.2, Lymphocytes # (Auto) 0.5 L, Monocytes 

# (Auto) 0.3, Eosinophils # (Auto) 0.0, Basophils # (Auto) 0.0


FSBS





Laboratory Tests








Test


  1/29/17


12:18 1/29/17


17:30 1/29/17


20:05 1/30/17


07:20 Range/Units


 


 


Bedside Glucose (Misc Panel) 355 170 201 178   MG/DL








Microbiology





 Microbiology


1/28/17 Blood Culture - Preliminary, Resulted


          No Growth after 48 hours. All Specime...


1/28/17 MRSA Screen - Final, Complete


          Staph.aureus Methicillin Resis








LAWRENCE COMER MD Jan 30, 2017 11:44

## 2017-01-30 NOTE — EDDOCDS
Physician Documentation                                                                           

[facility]                                                                         

Name: Kristie Jarrett                                                                               

Age: 66 yrs                                                                                       

Sex: Female                                                                                       

: 1950                                                                                   

MRN: [id number]                                                                                     

Arrival Date: 2017                                                                          

Time: 00:17                                                                                       

Account#: [id number]                                                                              

Bed Admit Hold                                                                                    

Private MD: Ottoniel Griggs P                                                                     

Disposition:                                                                                      

17 02:20 Hospitalization ordered by Lakshmi Anglin for Inpatient Admission.               

Preliminary diagnosis are Chronic combined systolic (congestive) and                            

diastolic (congestive) heart failure, Chronic obstructive pulmonary disease                     

with (acute) exacerbation.                                                                      

- Bed requested for M ICU.                                                                        

- Status is Inpatient Admission.                                                              kc3 

- Condition is Stable.                                                                            

- Problem is chronic.                                                                             

- Symptoms have improved.                                                                         

                                                                                                  

                                                                                                  

                                                                                                  

Historical:                                                                                       

- Allergies: GABAPENTIN; Spironolactone; Vancomycin; Streptokinase; Mucinex;                      

- Home Meds:                                                                                      

1. Advair Diskus 250-50 mcg/dose Inhl dsdv 1 puff 2 times per day                               

2. albuterol sulfate 1.25 mg/3 mL Inhl nebu 3 mL 3-4 times daily                                

3. amlodipine 10 mg Oral tab 1 tab once daily                                                   

4. aspirin 325 mg Oral tab 1 tab once daily                                                     

5. atorvastatin 40 mg oral tab 1 tab once daily                                                 

6. calcium carbonate 650 mg calcium (1,625 mg) Oral tab daily                                   

7. carvedilol 25 mg oral tab 1 tab 2 times per day                                              

8. Drisdol 50,000 unit Oral cap 1 cap monthly                                                   

9. folic acid 1 mg Oral tab 1 tab once daily                                                    

10. Lasix 20 mg Oral tab 1 tab 2 times per day                                                  

11. levetiracetam 1,000 mg oral tab 1 tab every 12 hours                                        

12. nitroglycerin 0.6 mg SL subl 1 tab every 5 minutes                                          

13. Novolog 100 unit/mL Sub-Q soln sliding scale                                                

14. omeprazole 40 mg Oral cpDR 1 cap once daily                                                 

15. oxybutynin chloride 5 mg Oral tab 1 tab daily                                               

16. Toujeo SoloStar 300 unit/mL (1.5 mL) subcutaneous inpn 16 units BID                         

17. Prolia 60 mg/mL subcutaneous syrg 1 mL every 2 years                                        

18. oxycodone 5 mg Oral tab 1 tab every 4 hours                                                 

19. trazodone 100 mg Oral tab three times a day                                                 

20. pregabalin 50 mg Oral cap 1 cap twice a day                                                 

- PMHx: CAD; COPD; Diabetes - IDDM: controlled; Hypercholesterolemia; Hypertension;               

Kidney stones; MI; Renal Failure w/o Dialysis; Sleep Apnea w/ BiPap; vitreous                   

hemorrhage; CHF; chronic kidney disease;                                                        

- PSHx: triple bypass; ; Ankle Arthroplasty, Left; Appendectomy; Hysterectomy;           

Carpal Tunnel Repair- Bilateral;                                                                

- Social history: Smoking status: Patient/guardian denies using alcohol, street drugs,            

No barriers to communication noted, The patient speaks fluent English, states quit              

smoking yesterday.                                                                              

- Family history: Not pertinent.                                                                  

- : The pt / caregiver states he / she is not on anticoagulants. Home medication list             

is obtained from the patient.                                                                   

- Exposure Risk Screening:: None identified.                                                      

                                                                                                  

                                                                                                  

Vital Signs:                                                                                      

                                                                                             

00:38  / 59; Pulse 70; Resp 22; Temp 97.6(O); Pulse Ox 98% on R/A; Weight 63.5 kg /     jp6 

      139.99 lbs; Height 5 ft. 1 in. (154.94 cm); Pain 4/10;                                      

00:45  / 59 (auto/);                                                                    jp6 

00:46 Pulse 70 MON; Pulse Ox 96% ;                                                            jp6 

01:00  / 62 (auto/);                                                                    jp6 

01:01 Pulse 71 MON; Pulse Ox 100% ;                                                           jp6 

01:15  / 59 (auto/);                                                                    jp6 

01:16 Pulse 72 MON; Pulse Ox 100% ;                                                           jp6 

01:30  / 65 (auto/);                                                                    jp6 

01:31 Pulse 68 MON; Pulse Ox 100% ;                                                           jp6 

01:47  / 63 (auto/);                                                                    jp6 

01:47 Pulse 69 MON; Pulse Ox 93% ;                                                            jp6 

02:00  / 60 (auto/);                                                                    jp6 

02:01 Pulse 68 MON; Pulse Ox 93% ;                                                            jp6 

02:15  / 65 (auto/);                                                                    jp6 

02:16 Pulse 70 MON; Pulse Ox 93% ;                                                            jp6 

02:30  / 61 (auto/);                                                                    jp6 

02:31 Pulse 69 MON; Pulse Ox 93% ;                                                            jp6 

02:45  / 74 (auto/);                                                                    jp6 

02:46 Pulse 70 MON; Pulse Ox 91% ;                                                            jp6 

03:00  / 61 (auto/);                                                                    jp6 

03:01 Pulse 71 MON; Pulse Ox 91% ;                                                            jp6 

03:15  / 65 (auto/);                                                                    jp6 

03:16 Pulse 70 MON; Pulse Ox 92% ;                                                            jp6 

03:30  / 65 (auto/);                                                                    jp6 

03:31 Pulse 70 MON; Pulse Ox 86% ;                                                            jp6 

03:45  / 64 (auto/);                                                                    jp6 

03:46 Pulse 68 MON; Pulse Ox 88% ;                                                            jp6 

03:53 Pulse 69 MON; Pulse Ox 98% ;                                                            jp6 

03:58 Pulse 68 MON; Pulse Ox 98% ;                                                            jp6 

04:00  / 64 (auto/);                                                                    jp6 

04:01 Pulse 68 MON; Pulse Ox 99% ;                                                            jp6 

04:15  / 65 (auto/);                                                                    jp6 

04:16 Pulse 68 MON; Pulse Ox 99% ;                                                            jp6 

04:30  / 69 (auto/);                                                                    jp6 

04:31 Pulse 70 MON; Pulse Ox 99% ;                                                            jp6 

04:45  / 67 (auto/);                                                                    jp6 

04:46 Pulse 70 MON; Pulse Ox 99% ;                                                            jp6 

05:00  / 67 (auto/);                                                                    jp6 

05:01 Pulse 70 MON; Pulse Ox 99% ;                                                            jp6 

05:15  / 66 (auto/);                                                                    jp6 

05:16 Pulse 71 MON; Pulse Ox 98% ;                                                            jp6 

05:30  / 70 (auto/);                                                                    jp6 

05:31 Pulse 70 MON; Pulse Ox 96% ;                                                            jp6 

05:45  / 72 (auto/);                                                                    jp6 

05:46 Pulse 69 MON; Pulse Ox 94% ;                                                            jp6 

06:00  / 73 (auto/);                                                                    jp6 

06:01 Pulse 68 MON; Pulse Ox 93% ;                                                            jp6 

06:15  / 68 (auto/);                                                                    jp6 

06:16 Pulse 66 MON; Pulse Ox 95% ;                                                            jp6 

06:26 Temp 97(T);                                                                             jp6 

06:30  / 73 (auto/);                                                                    jp6 

06:31 Pulse 67 MON; Pulse Ox 94% ;                                                            jp6 

06:45  / 69 (auto/);                                                                    jp6 

06:46 Pulse 65 MON; Pulse Ox 94% ;                                                            jp6 

07:00  / 71 (auto/);                                                                    kc3 

07:00 Pulse 66 MON; Pulse Ox 94% ;                                                            kc3 

07:15  / 78 (auto/);                                                                    kc3 

07:16 Pulse 70 MON; Pulse Ox 97% ;                                                            kc3 

07:30  / 70 (auto/);                                                                    kc3 

07:31 Pulse 70 MON; Pulse Ox 93% ;                                                            kc3 

07:45  / 68 (auto/);                                                                    kc3 

07:45 Pulse 70 MON; Pulse Ox 93% ;                                                            kc3 

08:00  / 68 (auto/);                                                                    kc3 

08:00 Pulse 70 MON; Pulse Ox 95% ;                                                            kc3 

08:15  / 68 (auto/);                                                                    kc3 

08:15 Pulse 68 MON; Pulse Ox 96% ;                                                            kc3 

08:30  / 71 (auto/);                                                                    kc3 

08:30 Pulse 69 MON; Pulse Ox 96% ;                                                            kc3 

08:45  / 67 (auto/);                                                                    kc3 

08:45 Pulse 69 MON; Pulse Ox 95% ;                                                            kc3 

09:00  / 68 (auto/);                                                                    kc3 

09:01 Pulse 69 MON; Pulse Ox 96% ;                                                            kc3 

09:15  / 68 (auto/);                                                                    kc3 

09:16 Pulse 69 MON; Pulse Ox 94% ;                                                            kc3 

09:30  / 99 (auto/);                                                                    kc3 

09:31 Pulse 73 MON; Pulse Ox 95% ;                                                            kc3 

09:45  / 71 (auto/);                                                                    kc3 

09:45 Pulse 71 MON; Pulse Ox 93% ;                                                            kc3 

10:00  / 77 (auto/);                                                                    kc3 

10:00 Pulse 69 MON; Pulse Ox 94% ;                                                            kc3 

10:15  / 71 (auto/);                                                                    kc3 

10:15 Pulse 70 MON; Resp 22; Temp 99.4(TE); Pulse Ox 93% ;                                    kc3 

10:30  / 70 (auto/);                                                                    kc3 

10:31 Pulse 72 MON; Pulse Ox 95% ;                                                            kc3 

10:45  / 76 (auto/);                                                                    kc3 

10:46 Pulse 74 MON; Pulse Ox 95% ;                                                            kc3 

11:00  / 70 (auto/);                                                                    kc3 

11:01 Pulse 70 MON; Pulse Ox 96% ;                                                            kc3 

11:15  / 59 (auto/);                                                                    kc3 

11:16 Pulse 74 MON; Pulse Ox 96% ;                                                            kc3 

11:30  / 70 (auto/);                                                                    kc3 

11:30 Pulse 69 MON; Resp 24; Temp 98.8(TE); Pulse Ox 98% ;                                    kc3 

00:38 Body Mass Index 26.45 (63.50 kg, 154.94 cm)                                             jp6 

10:15 pt on bipap                                                                             kc3 

11:30 pt on bipap                                                                             kc3 

                                                                                                  

MDM:                                                                                              

00:42 Call Respiratory ordered.                                                               cs11

00:42 -Blood Culture (Adults Only), peripheral from different site, or from device/port/PICC  cs11

      etc. if present ordered.                                                                    

00:42 IV Saline Lock ordered.                                                                 cs11

00:42 Dexamethasone 15 mg IV at bolus once ordered.                                           cs11

00:42 Nitro-Bid Ointment 2 % 0.5 inches Transdermal once ordered.                             cs11

00:43 Furosemide 60 mg IVP once ordered.                                                      cs11

00:44 Chest, 1 View Ordered.                                                                  EDMS

00:44 ECG WITH READING ER PHYS+CARDIAG ordered.                                               EDMS

00:44 -Arterial Blood Gas Ordered.                                                            EDMS

00:44 Lactic Acid (Gray tube on ice) Ordered.                                                 EDMS

00:44 CBC with Diff Ordered.                                                                  EDMS

00:44 MED Profile Ordered.                                                                    EDMS

00:44 Cardiac Marker Panel Ordered.                                                           EDMS

00:44 BNP Ordered.                                                                            EDMS

00:44 -Blood Culture Ordered.                                                                 EDMS

00:54 Call Respiratory complete.                                                              jp6 

00:54 Ondansetron 4 mg IVP once ordered.                                                      jp6 

01:11 -Arterial Blood Gas Reviewed.                                                           cs11

01:16 -Blood Culture (Adults Only), peripheral from different site, or from device/port/PICC  jp6 

      etc. if present complete.                                                                   

01:26 MED Profile Reviewed.                                                                   cs11

01:26 Lactic Acid (Gray tube on ice) Reviewed.                                                cs11

01:26 Cardiac Marker Panel Reviewed.                                                          cs11

01:38 BED REQUEST+ADM ordered.                                                                EDMS

01:48 Financial registration complete.                                                        hs2 

02:02 [facility] Payment Agreement was scanned into [employer] and attached to record.               hs2 

02:16 Albuterol-Ipratropium 1 neb Nebulizer every 20 minutes x3 ordered.                      cs11

02:16 Call Respiratory ordered.                                                               cs11

02:16 CBC with Diff Reviewed.                                                                 cs11

02:16 BNP Reviewed.                                                                           cs11

02:22 Call Respiratory complete.                                                              jlm 

04:01 Admission / Observation Status ordered.                                                 EDMS

04:01 ECHOCARD,DOPPLER/COLOR FLOW ordered.                                                    EDMS

04:01 NO ADDED SALT DIET ordered.                                                             EDMS

04:01 CARDIAC MARKER PANEL Ordered.                                                           EDMS

04:02 ARTERIAL BLOOD GAS Ordered.                                                             EDMS

04:02 BIPAP INPATIENT ordered.                                                                EDMS

05:23 Admission / Observation Status ordered.                                                 EDMS

06:16 -Arterial Blood Gas Ordered.                                                            EDMS

07:45 ARTERIAL BLOOD GAS Ordered.                                                             EDMS

08:06 Written Provider Order was scanned into [employer] and attached to record.                 deg 

08:09 Fingerstick Blood Sugar Ordered.                                                        EDMS

09:02 T-Sheet-- Draft Copy was scanned into [employer] and attached to record.                   se 

10:32 MRSA SCREEN Ordered.                                                                    EDMS

                                                                                             

17:42 ECG/EKG was scanned into [employer] and attached to record.                                kf3 

                                                                                                  

Administered Medications:                                                                         

                                                                                             

00:35 Drug: Albuterol-Ipratropium 1 neb [ipratropium-albuterol 0.5 mg-3 mg(2.5 mg base)/3 mL  jc3 

      nebulization soln (1 neb)] Route: Nebulizer;                                                

00:45 Drug: Albuterol-Ipratropium 1 neb [ipratropium-albuterol 0.5 mg-3 mg(2.5 mg base)/3 mL  jc3 

      nebulization soln (1 neb)] Route: Nebulizer;                                                

00:54 Drug: Ondansetron 4 mg [ondansetron HCl 2 mg/mL intravenous solution (2 mL)] Route:     jp6 

      IVP; Site: left forearm;                                                                    

00:55 Drug: Albuterol-Ipratropium 1 neb [ipratropium-albuterol 0.5 mg-3 mg(2.5 mg base)/3 mL  jc3 

      nebulization soln (1 neb)] Route: Nebulizer;                                                

01:15 Drug: Furosemide 60 mg [furosemide 10 mg/mL injection solution (6 mL)] Route: IVP;      jp6 

      Site: left forearm;                                                                         

01:16 Drug: Dexamethasone 15 mg [dexamethasone 4 mg/mL injection solution] Route: IV; Rate:   jp6 

      bolus; Site: left forearm;                                                                  

01:16 Drug: Nitro-Bid 0.5 inches [Nitro-Bid 2 % transdermal ointment (0.5 inches)] Route:     jp6 

      Transdermal; Site: anterior chest wall;                                                     

                                                                                                  

                                                                                                  

Signatures:                                                                                       

Dispatcher [employer]                           EDMS                                                 

Sabiha Olivarez, Unit Clerk              Unit deg                                                  

Taylor Castillo RN                        RN   dls                                                  

Glenn Calvo, Reg                      Reg  kf3                                                  

Fredy Garnett,                        DO   cs11                                                 

Jeanine Tillman, Unit Clerk            Unit jlm                                                  

Donya Chamberlain,GREGG                          RN   kc3                                                  

Christa Barrera, Reg                   Reg  hs2                                                  

Chelsi Fregoso RN                       RN   jp6                                                  

Basia Chicas Joseph                              3                                                  

                                                                                                  

The chart was reviewed and I authenticate all verbal orders and agree with the evaluation and 
treatment provided.Attachments:

02:02 [facility] Payment Agreement                                                                hs2 

08:06 Written Provider Order                                                                  deg 

09:02 T-Sheet-- Draft Copy                                                                    [facility] 

                                                                                             

17:42 ECG/EKG                                                                                 [id number] 

                                                                                                  

**************************************************************************************************



*** Chart Complete ***
MTDD

## 2017-01-30 NOTE — EDDOCDS
Physician Documentation                                                                           

[facility]                                                                         

Name: Kristie Jarrett                                                                               

Age: 66 yrs                                                                                       

Sex: Female                                                                                       

: 1950                                                                                   

MRN: [id number]                                                                                     

Arrival Date: 2017                                                                          

Time: 00:17                                                                                       

Account#: [id number]                                                                              

Bed Admit Hold                                                                                    

Private MD: Ottoniel Griggs P                                                                     

Disposition:                                                                                      

17 02:20 Hospitalization ordered by Lakshmi Anglin for Inpatient Admission.               

Preliminary diagnosis are Chronic combined systolic (congestive) and                            

diastolic (congestive) heart failure, Chronic obstructive pulmonary disease                     

with (acute) exacerbation.                                                                      

- Bed requested for M ICU.                                                                        

- Status is Inpatient Admission.                                                              kc3 

- Condition is Stable.                                                                            

- Problem is chronic.                                                                             

- Symptoms have improved.                                                                         

                                                                                                  

                                                                                                  

                                                                                                  

Historical:                                                                                       

- Allergies: GABAPENTIN; Spironolactone; Vancomycin; Streptokinase; Mucinex;                      

- Home Meds:                                                                                      

1. Advair Diskus 250-50 mcg/dose Inhl dsdv 1 puff 2 times per day                               

2. albuterol sulfate 1.25 mg/3 mL Inhl nebu 3 mL 3-4 times daily                                

3. amlodipine 10 mg Oral tab 1 tab once daily                                                   

4. aspirin 325 mg Oral tab 1 tab once daily                                                     

5. atorvastatin 40 mg oral tab 1 tab once daily                                                 

6. calcium carbonate 650 mg calcium (1,625 mg) Oral tab daily                                   

7. carvedilol 25 mg oral tab 1 tab 2 times per day                                              

8. Drisdol 50,000 unit Oral cap 1 cap monthly                                                   

9. folic acid 1 mg Oral tab 1 tab once daily                                                    

10. Lasix 20 mg Oral tab 1 tab 2 times per day                                                  

11. levetiracetam 1,000 mg oral tab 1 tab every 12 hours                                        

12. nitroglycerin 0.6 mg SL subl 1 tab every 5 minutes                                          

13. Novolog 100 unit/mL Sub-Q soln sliding scale                                                

14. omeprazole 40 mg Oral cpDR 1 cap once daily                                                 

15. oxybutynin chloride 5 mg Oral tab 1 tab daily                                               

16. Toujeo SoloStar 300 unit/mL (1.5 mL) subcutaneous inpn 16 units BID                         

17. Prolia 60 mg/mL subcutaneous syrg 1 mL every 2 years                                        

18. oxycodone 5 mg Oral tab 1 tab every 4 hours                                                 

19. trazodone 100 mg Oral tab three times a day                                                 

20. pregabalin 50 mg Oral cap 1 cap twice a day                                                 

- PMHx: CAD; COPD; Diabetes - IDDM: controlled; Hypercholesterolemia; Hypertension;               

Kidney stones; MI; Renal Failure w/o Dialysis; Sleep Apnea w/ BiPap; vitreous                   

hemorrhage; CHF; chronic kidney disease;                                                        

- PSHx: triple bypass; ; Ankle Arthroplasty, Left; Appendectomy; Hysterectomy;           

Carpal Tunnel Repair- Bilateral;                                                                

- Social history: Smoking status: Patient/guardian denies using alcohol, street drugs,            

No barriers to communication noted, The patient speaks fluent English, states quit              

smoking yesterday.                                                                              

- Family history: Not pertinent.                                                                  

- : The pt / caregiver states he / she is not on anticoagulants. Home medication list             

is obtained from the patient.                                                                   

- Exposure Risk Screening:: None identified.                                                      

                                                                                                  

                                                                                                  

Vital Signs:                                                                                      

                                                                                             

00:38  / 59; Pulse 70; Resp 22; Temp 97.6(O); Pulse Ox 98% on R/A; Weight 63.5 kg /     jp6 

      139.99 lbs; Height 5 ft. 1 in. (154.94 cm); Pain 4/10;                                      

00:45  / 59 (auto/);                                                                    jp6 

00:46 Pulse 70 MON; Pulse Ox 96% ;                                                            jp6 

01:00  / 62 (auto/);                                                                    jp6 

01:01 Pulse 71 MON; Pulse Ox 100% ;                                                           jp6 

01:15  / 59 (auto/);                                                                    jp6 

01:16 Pulse 72 MON; Pulse Ox 100% ;                                                           jp6 

01:30  / 65 (auto/);                                                                    jp6 

01:31 Pulse 68 MON; Pulse Ox 100% ;                                                           jp6 

01:47  / 63 (auto/);                                                                    jp6 

01:47 Pulse 69 MON; Pulse Ox 93% ;                                                            jp6 

02:00  / 60 (auto/);                                                                    jp6 

02:01 Pulse 68 MON; Pulse Ox 93% ;                                                            jp6 

02:15  / 65 (auto/);                                                                    jp6 

02:16 Pulse 70 MON; Pulse Ox 93% ;                                                            jp6 

02:30  / 61 (auto/);                                                                    jp6 

02:31 Pulse 69 MON; Pulse Ox 93% ;                                                            jp6 

02:45  / 74 (auto/);                                                                    jp6 

02:46 Pulse 70 MON; Pulse Ox 91% ;                                                            jp6 

03:00  / 61 (auto/);                                                                    jp6 

03:01 Pulse 71 MON; Pulse Ox 91% ;                                                            jp6 

03:15  / 65 (auto/);                                                                    jp6 

03:16 Pulse 70 MON; Pulse Ox 92% ;                                                            jp6 

03:30  / 65 (auto/);                                                                    jp6 

03:31 Pulse 70 MON; Pulse Ox 86% ;                                                            jp6 

03:45  / 64 (auto/);                                                                    jp6 

03:46 Pulse 68 MON; Pulse Ox 88% ;                                                            jp6 

03:53 Pulse 69 MON; Pulse Ox 98% ;                                                            jp6 

03:58 Pulse 68 MON; Pulse Ox 98% ;                                                            jp6 

04:00  / 64 (auto/);                                                                    jp6 

04:01 Pulse 68 MON; Pulse Ox 99% ;                                                            jp6 

04:15  / 65 (auto/);                                                                    jp6 

04:16 Pulse 68 MON; Pulse Ox 99% ;                                                            jp6 

04:30  / 69 (auto/);                                                                    jp6 

04:31 Pulse 70 MON; Pulse Ox 99% ;                                                            jp6 

04:45  / 67 (auto/);                                                                    jp6 

04:46 Pulse 70 MON; Pulse Ox 99% ;                                                            jp6 

05:00  / 67 (auto/);                                                                    jp6 

05:01 Pulse 70 MON; Pulse Ox 99% ;                                                            jp6 

05:15  / 66 (auto/);                                                                    jp6 

05:16 Pulse 71 MON; Pulse Ox 98% ;                                                            jp6 

05:30  / 70 (auto/);                                                                    jp6 

05:31 Pulse 70 MON; Pulse Ox 96% ;                                                            jp6 

05:45  / 72 (auto/);                                                                    jp6 

05:46 Pulse 69 MON; Pulse Ox 94% ;                                                            jp6 

06:00  / 73 (auto/);                                                                    jp6 

06:01 Pulse 68 MON; Pulse Ox 93% ;                                                            jp6 

06:15  / 68 (auto/);                                                                    jp6 

06:16 Pulse 66 MON; Pulse Ox 95% ;                                                            jp6 

06:26 Temp 97(T);                                                                             jp6 

06:30  / 73 (auto/);                                                                    jp6 

06:31 Pulse 67 MON; Pulse Ox 94% ;                                                            jp6 

06:45  / 69 (auto/);                                                                    jp6 

06:46 Pulse 65 MON; Pulse Ox 94% ;                                                            jp6 

07:00  / 71 (auto/);                                                                    kc3 

07:00 Pulse 66 MON; Pulse Ox 94% ;                                                            kc3 

07:15  / 78 (auto/);                                                                    kc3 

07:16 Pulse 70 MON; Pulse Ox 97% ;                                                            kc3 

07:30  / 70 (auto/);                                                                    kc3 

07:31 Pulse 70 MON; Pulse Ox 93% ;                                                            kc3 

07:45  / 68 (auto/);                                                                    kc3 

07:45 Pulse 70 MON; Pulse Ox 93% ;                                                            kc3 

08:00  / 68 (auto/);                                                                    kc3 

08:00 Pulse 70 MON; Pulse Ox 95% ;                                                            kc3 

08:15  / 68 (auto/);                                                                    kc3 

08:15 Pulse 68 MON; Pulse Ox 96% ;                                                            kc3 

08:30  / 71 (auto/);                                                                    kc3 

08:30 Pulse 69 MON; Pulse Ox 96% ;                                                            kc3 

08:45  / 67 (auto/);                                                                    kc3 

08:45 Pulse 69 MON; Pulse Ox 95% ;                                                            kc3 

09:00  / 68 (auto/);                                                                    kc3 

09:01 Pulse 69 MON; Pulse Ox 96% ;                                                            kc3 

09:15  / 68 (auto/);                                                                    kc3 

09:16 Pulse 69 MON; Pulse Ox 94% ;                                                            kc3 

09:30  / 99 (auto/);                                                                    kc3 

09:31 Pulse 73 MON; Pulse Ox 95% ;                                                            kc3 

09:45  / 71 (auto/);                                                                    kc3 

09:45 Pulse 71 MON; Pulse Ox 93% ;                                                            kc3 

10:00  / 77 (auto/);                                                                    kc3 

10:00 Pulse 69 MON; Pulse Ox 94% ;                                                            kc3 

10:15  / 71 (auto/);                                                                    kc3 

10:15 Pulse 70 MON; Resp 22; Temp 99.4(TE); Pulse Ox 93% ;                                    kc3 

10:30  / 70 (auto/);                                                                    kc3 

10:31 Pulse 72 MON; Pulse Ox 95% ;                                                            kc3 

10:45  / 76 (auto/);                                                                    kc3 

10:46 Pulse 74 MON; Pulse Ox 95% ;                                                            kc3 

11:00  / 70 (auto/);                                                                    kc3 

11:01 Pulse 70 MON; Pulse Ox 96% ;                                                            kc3 

11:15  / 59 (auto/);                                                                    kc3 

11:16 Pulse 74 MON; Pulse Ox 96% ;                                                            kc3 

11:30  / 70 (auto/);                                                                    kc3 

11:30 Pulse 69 MON; Resp 24; Temp 98.8(TE); Pulse Ox 98% ;                                    kc3 

00:38 Body Mass Index 26.45 (63.50 kg, 154.94 cm)                                             jp6 

10:15 pt on bipap                                                                             kc3 

11:30 pt on bipap                                                                             kc3 

                                                                                                  

MDM:                                                                                              

00:42 Call Respiratory ordered.                                                               cs11

00:42 -Blood Culture (Adults Only), peripheral from different site, or from device/port/PICC  cs11

      etc. if present ordered.                                                                    

00:42 IV Saline Lock ordered.                                                                 cs11

00:42 Dexamethasone 15 mg IV at bolus once ordered.                                           cs11

00:42 Nitro-Bid Ointment 2 % 0.5 inches Transdermal once ordered.                             cs11

00:43 Furosemide 60 mg IVP once ordered.                                                      cs11

00:44 Chest, 1 View Ordered.                                                                  EDMS

00:44 ECG WITH READING ER PHYS+CARDIAG ordered.                                               EDMS

00:44 -Arterial Blood Gas Ordered.                                                            EDMS

00:44 Lactic Acid (Gray tube on ice) Ordered.                                                 EDMS

00:44 CBC with Diff Ordered.                                                                  EDMS

00:44 MED Profile Ordered.                                                                    EDMS

00:44 Cardiac Marker Panel Ordered.                                                           EDMS

00:44 BNP Ordered.                                                                            EDMS

00:44 -Blood Culture Ordered.                                                                 EDMS

00:54 Call Respiratory complete.                                                              jp6 

00:54 Ondansetron 4 mg IVP once ordered.                                                      jp6 

01:11 -Arterial Blood Gas Reviewed.                                                           cs11

01:16 -Blood Culture (Adults Only), peripheral from different site, or from device/port/PICC  jp6 

      etc. if present complete.                                                                   

01:26 MED Profile Reviewed.                                                                   cs11

01:26 Lactic Acid (Gray tube on ice) Reviewed.                                                cs11

01:26 Cardiac Marker Panel Reviewed.                                                          cs11

01:38 BED REQUEST+ADM ordered.                                                                EDMS

01:48 Financial registration complete.                                                        hs2 

02:02 [facility] Payment Agreement was scanned into [facility] [employer] and attached to record.               hs2 

02:16 Albuterol-Ipratropium 1 neb Nebulizer every 20 minutes x3 ordered.                      cs11

02:16 Call Respiratory ordered.                                                               cs11

02:16 CBC with Diff Reviewed.                                                                 cs11

02:16 BNP Reviewed.                                                                           cs11

02:22 Call Respiratory complete.                                                              jlm 

04:01 Admission / Observation Status ordered.                                                 EDMS

04:01 ECHOCARD,DOPPLER/COLOR FLOW ordered.                                                    EDMS

04:01 NO ADDED SALT DIET ordered.                                                             EDMS

04:01 CARDIAC MARKER PANEL Ordered.                                                           EDMS

04:02 ARTERIAL BLOOD GAS Ordered.                                                             EDMS

04:02 BIPAP INPATIENT ordered.                                                                EDMS

05:23 Admission / Observation Status ordered.                                                 EDMS

06:16 -Arterial Blood Gas Ordered.                                                            EDMS

07:45 ARTERIAL BLOOD GAS Ordered.                                                             EDMS

08:06 Written Provider Order was scanned into [facility] and attached to record.                 deg 

08:09 Fingerstick Blood Sugar Ordered.                                                        EDMS

09:02 T-Sheet-- Draft Copy was scanned into [employer] and attached to record.                   se 

10:32 MRSA SCREEN Ordered.                                                                    EDMS

                                                                                             

17:42 ECG/EKG was scanned into [facility] and attached to record.                                kf3 

                                                                                                  

Administered Medications:                                                                         

                                                                                             

00:35 Drug: Albuterol-Ipratropium 1 neb [ipratropium-albuterol 0.5 mg-3 mg(2.5 mg base)/3 mL  jc3 

      nebulization soln (1 neb)] Route: Nebulizer;                                                

00:45 Drug: Albuterol-Ipratropium 1 neb [ipratropium-albuterol 0.5 mg-3 mg(2.5 mg base)/3 mL  jc3 

      nebulization soln (1 neb)] Route: Nebulizer;                                                

00:54 Drug: Ondansetron 4 mg [ondansetron HCl 2 mg/mL intravenous solution (2 mL)] Route:     jp6 

      IVP; Site: left forearm;                                                                    

00:55 Drug: Albuterol-Ipratropium 1 neb [ipratropium-albuterol 0.5 mg-3 mg(2.5 mg base)/3 mL  jc3 

      nebulization soln (1 neb)] Route: Nebulizer;                                                

01:15 Drug: Furosemide 60 mg [furosemide 10 mg/mL injection solution (6 mL)] Route: IVP;      jp6 

      Site: left forearm;                                                                         

01:16 Drug: Dexamethasone 15 mg [dexamethasone 4 mg/mL injection solution] Route: IV; Rate:   jp6 

      bolus; Site: left forearm;                                                                  

01:16 Drug: Nitro-Bid 0.5 inches [Nitro-Bid 2 % transdermal ointment (0.5 inches)] Route:     jp6 

      Transdermal; Site: anterior chest wall;                                                     

                                                                                                  

                                                                                                  

Signatures:                                                                                       

Dispatcher [facility] BAG                           EDMS                                                 

Sabiha Olivarez, Unit Clerk              Unit deg                                                  

Taylor Castillo RN                        RN   dls                                                  

Glenn Calvo, Reg                      Reg  kf3                                                  

Fredy Garnett,                        DO   cs11                                                 

Jeanine Tillman, Unit Clerk            Unit jlm                                                  

Donya Chamberlain,GREGG                          RN   kc3                                                  

Christa Barrera, Reg                   Reg  hs2                                                  

Chelsi Fregoso RN                       RN   jp6                                                  

Basia Chicas Joseph                              3                                                  

                                                                                                  

The chart was reviewed and I authenticate all verbal orders and agree with the evaluation and 
treatment provided.Attachments:

02:02 [facility] Payment Agreement                                                                hs2 

08:06 Written Provider Order                                                                  deg 

09:02 T-Sheet-- Draft Copy                                                                    [facility] 

                                                                                             

17:42 ECG/EKG                                                                                 [id number] 

                                                                                                  

**************************************************************************************************



*** Chart Complete ***
MTDD

## 2017-01-30 NOTE — ECHO
DATE OF PROCEDURE: 01/28/2017

 

YOB: 1950

AGE: 66

 

REFERRING PROVIDER: Dr. Lakshmi Anglin.

 

PATIENT LOCATION: Room 3204.

 

REASON FOR ECHOCARDIOGRAM:  Shortness of breath.

 

2D MEASUREMENTS:

IVS: 1.0 cm

LV: ______________________ cm

LVPW: 1.0 cm

LA: 5.2 cm

Aorta: 3.3 cm

IVC: 2.0 cm

 

DOPPLER MEASUREMENTS:

Mitral E: 1.7   Mitral A: 1.9   Ratio less than 1.0.

Maximum tricuspid valve velocity: 3.0 m/s

 

2D COMMENTS:

1.  Normal left ventricular size and systolic function.  Subjectively, left

ventricular wall thickness appeared to be normal.  The estimated global left

ventricular systolic ejection fraction is 60% to 65%.

2.  Moderately enlarged left atrium. Normal right atrium and right ventricle.

3.  The atrial septum appeared to be normal without evidence of defect or shunt.

 

4.  Normal aortic root.

5.  Trace pericardial effusion noted, no evidence of cardiac tamponade.

6.  Mildly calcified aortic valve with normal leaflet excursion. Mildly calcified

mitral annulus with normal anterior mitral valve leaflet motion. Normal tricuspid

valve. The pulmonic valve and proximal pulmonary artery branches were not well

visualized.

 

DOPPLER:  It detects moderate to severe mitral regurgitation and mild to moderate

tricuspid regurgitation. The calculated pulmonary artery systolic pressure varies

between 40 to 50 mmHg. Abnormal relaxation pattern was noted across the mitral

valve leaflets as well as the mitral valve annulus consistent with grade 1 left

ventricular diastolic dysfunction.

 

IMPRESSION:

1.  Normal global left ventricular systolic function. There are some features of

left ventricular diastolic dysfunction, grade 1.

2.  Aortic valve sclerosis without stenosis or aortic regurgitation.

3.  Moderately enlarged left atrium with mitral annulus calcification and

moderate to severe mitral regurgitation.

4.  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

hypertension.  The right heart chambers, however, appear to be normal.

5.  Trace pericardial effusion noted, no evidence of cardiac tamponade.

## 2017-01-30 NOTE — EDDOCDS
Nurse's Notes                                                                                     

[facility]                                                                         

Name: Kristie Jarrett                                                                               

Age: 66 yrs                                                                                       

Sex: Female                                                                                       

: 1950                                                                                   

MRN: [id number]                                                                                     

Arrival Date: 2017                                                                          

Time: 00:17                                                                                       

Account#: [id number]                                                                              

Bed Admit Hold                                                                                    

Private MD: Ottoniel Griggs P                                                                     

Diagnosis: Chronic combined systolic (congestive) and diastolic (congestive) heart failure;Chronic

obstructive pulmonary disease with (acute) exacerbation                                         

                                                                                                  

Presentation:                                                                                     

                                                                                             

00:29 Presenting complaint: Patient states: Over the last 3 days breathing has gotten         jp6 

      worse-,back pain and productive cough yellow sputum.Denies fever/chills. Adult Sepsis       

      Screening: The patient does not have new or worsening altered mentation. Patient has a      

      respiratory rate of greater than or equal to 22 (1 point). Systolic blood pressure is       

      greater than 100. Patient has a qSOFA score of 1- Negative Sepsis Screen.                   

      Suicide/Homicide risk assessment- the patient denies having any suicidal and/or             

      homicidal ideations and does not present with any other emotional, behavioral or mental     

      health complaints.  Status: Patient is not a  or              

      dependent. Transition of care: patient was not received from another setting of care.       

00:29 Acuity: SUZETTE Level 2                                                                     6 

00:29 Method Of Arrival: Ambulance                                                            jp6 

                                                                                                  

Triage Assessment:                                                                                

00:38 General: Appears distressed, ill, uncomfortable, Behavior is appropriate for age,       jp6 

      cooperative. Pain: Location: chest Pain currently is 4 out of 10 on a pain scale.           

      Quality of pain is described as aching. The patient is triaged at the bedside. See          

      Assessment in Nurses Notes section of ED record. Neurological: Level of Consciousness       

      is awake, alert, Oriented to person, place, time. EENT: No deficits noted.                  

      Cardiovascular: Capillary refill < 3 seconds Rhythm is sinus rhythm. Respiratory:           

      Onset: The symptoms/episode began/occurred 3 days, Airway is patent Respiratory effort      

      is labored, shallow, Respiratory pattern is regular, Breath sounds are coarse               

      inspiratory expiratory bilaterally. Breath sounds with rales Breath sounds with             

      rhonchi. GI: No deficits noted. : No deficits noted. Derm: Skin is pink, warm & dry.    

      Musculoskeletal: No deficits noted. Injury Description: No known injury.                    

                                                                                                  

Historical:                                                                                       

- Allergies: GABAPENTIN; Spironolactone; Vancomycin; Streptokinase; Mucinex;                      

- Home Meds:                                                                                      

1. Advair Diskus 250-50 mcg/dose Inhl dsdv 1 puff 2 times per day                               

2. albuterol sulfate 1.25 mg/3 mL Inhl nebu 3 mL 3-4 times daily                                

3. amlodipine 10 mg Oral tab 1 tab once daily                                                   

4. aspirin 325 mg Oral tab 1 tab once daily                                                     

5. atorvastatin 40 mg oral tab 1 tab once daily                                                 

6. calcium carbonate 650 mg calcium (1,625 mg) Oral tab daily                                   

7. carvedilol 25 mg oral tab 1 tab 2 times per day                                              

8. Drisdol 50,000 unit Oral cap 1 cap monthly                                                   

9. folic acid 1 mg Oral tab 1 tab once daily                                                    

10. Lasix 20 mg Oral tab 1 tab 2 times per day                                                  

11. levetiracetam 1,000 mg oral tab 1 tab every 12 hours                                        

12. nitroglycerin 0.6 mg SL subl 1 tab every 5 minutes                                          

13. Novolog 100 unit/mL Sub-Q soln sliding scale                                                

14. omeprazole 40 mg Oral cpDR 1 cap once daily                                                 

15. oxybutynin chloride 5 mg Oral tab 1 tab daily                                               

16. Toujeo SoloStar 300 unit/mL (1.5 mL) subcutaneous inpn 16 units BID                         

17. Prolia 60 mg/mL subcutaneous syrg 1 mL every 2 years                                        

18. oxycodone 5 mg Oral tab 1 tab every 4 hours                                                 

19. trazodone 100 mg Oral tab three times a day                                                 

20. pregabalin 50 mg Oral cap 1 cap twice a day                                                 

- PMHx: CAD; COPD; Diabetes - IDDM: controlled; Hypercholesterolemia; Hypertension;               

Kidney stones; MI; Renal Failure w/o Dialysis; Sleep Apnea w/ BiPap; vitreous                   

hemorrhage; CHF; chronic kidney disease;                                                        

- PSHx: triple bypass; ; Ankle Arthroplasty, Left; Appendectomy; Hysterectomy;           

Carpal Tunnel Repair- Bilateral;                                                                

- Social history: Smoking status: Patient/guardian denies using alcohol, street drugs,            

No barriers to communication noted, The patient speaks fluent English, states quit              

smoking yesterday.                                                                              

- Family history: Not pertinent.                                                                  

- : The pt / caregiver states he / she is not on anticoagulants. Home medication list             

is obtained from the patient.                                                                   

- Exposure Risk Screening:: None identified.                                                      

                                                                                                  

                                                                                                  

Screenin:45 Screening information is obtained from the patient. Fall risk: At risk due to age.      jp6 

      Assistance ADL's: requires no assistance with activities of daily living. Abuse/DV          

      Screen: The patient / caregiver reports he/she is: not in a situation that causes fear,     

      pain or injury. Nutritional screening: No deficits noted. Advance Directives:               

      Currently, there is no health care proxy. There is an active DNR order but there is no      

      copy available at this time. There is no living will. home support is adequate.             

                                                                                                  

Assessment:                                                                                       

00:45 General: see triage assessment.                                                         jp6 

01:44 Reassessment: pt states breathing is a little better.. Cardiovascular: No deficits      jp6 

      noted. Rhythm is regular. Respiratory: Airway is patent Respiratory effort is even,         

      labored, Respiratory pattern is regular, symmetrical, Breath sounds are coarse Breath       

      sounds with rhonchi inspiratory expiratory Breath sounds with wheezes.                      

01:44 Respiratory: Reports wears bipap w/ O2 at night.                                        jp6 

02:45 Reassessment: Patient denies pain at this time. General: Appears ill, Behavior is       jp6 

      appropriate for age, cooperative, pleasant. Pain: Denies pain. Neurological: No             

      deficits noted. Level of Consciousness is awake, alert, Oriented to person, place,          

      time. Cardiovascular: No deficits noted. Rhythm is regular. Respiratory: Airway is          

      patent Respiratory effort is even, unlabored. GI: No deficits noted. : No deficits        

      noted. Derm: Skin is pink, warm & dry. Musculoskeletal: No deficits noted.                

03:57 Reassessment: Patient appears in no apparent distress at this time. General: Appears in jp6 

      no apparent distress, to be sleeping. Behavior is appropriate for age, cooperative.         

      Pain: Denies pain. Neurological: No deficits noted. Level of Consciousness is obeys         

      commands, Oriented to person, place, time. EENT: No deficits noted. Cardiovascular: No      

      deficits noted. Rhythm is sinus rhythm No ectopy. Respiratory: Airway is patent             

      Respiratory effort is even, unlabored, Respiratory pattern is regular, symmetrical, pt      

      has a history of sleep apnea-sats dropped into 70's with cpap on. Respiratory in and        

      put non rebreather back on and sat's came up to 98%. GI: No deficits noted. : No          

      deficits noted. Derm: Skin is pink, warm & dry. Musculoskeletal: No deficits noted.       

05:01 Reassessment: Patient appears in no apparent distress at this time. Patient denies pain jp6 

      at this time. Neurological: No deficits noted. Level of Consciousness is awake, alert,      

      Oriented to person, place, time. Cardiovascular: No deficits noted. Rhythm is regular.      

      Respiratory: Airway is patent Respiratory effort is even, unlabored. Derm: Skin is          

      pink, warm & dry.                                                                         

05:58 Reassessment: Patient appears in no apparent distress at this time. pt is sleeping      jp6 

      comfortably with bipap on. VS are stable at 132/72 HR-68 O2 sat-94%..                       

07:15 General: First assessment of this patient. Patient unable to answer questions at this   hs1 

      time. Patient vital signs are stable. MAR received at this time and medications             

      administered.. Pain: Unable to use pain scale. patient not able to wake long enough for     

      conversation at this time. Patient appears extremely fatigued. Respiratory: Airway is       

      patent Patient on BiPAP at present. Oxygen Sats maintained and patient is being             

      monitored by respiratory. Derm: Skin is pink, warm & dry.                                 

07:45 Reassessment: Patient appears in no apparent distress at this time. no change in        hs1 

      patient status. Michael BERNAL aware of FS and order received to hold insulin at present.         

      Order also received to hold PO medications until patient is more awake. .                   

08:33 General: Appears in no apparent distress, Patient appears to be sleeping at present,    hs1 

      however continues to be not wakeful for long periods of time for conversation. Pt           

      extremely fatigued. .                                                                       

09:35 General: Appears ill, Behavior is appropriate for age, cooperative, drowsy.             kc3 

      Neurological: Level of Consciousness is lethargic, Oriented to person, place, time, Pt      

      wakes to name but is not wakeful for long periods of time. Pt almost immediately falls      

      back asleep. . Cardiovascular: Rhythm is sinus rhythm. Respiratory: Airway is patent        

      Respiratory effort is even, unlabored, Pt remains on bipap machine. Oxygen sats             

      maintained and patient is being monitored by respiratory. Derm: Skin is pink, warm &      

      dry.                                                                                        

10:20 General: Appears ill, Behavior is appropriate for age, cooperative, drowsy.             kc3 

      Neurological: Level of Consciousness is lethargic, Pt continues to wake to name but         

      does not maintain wakefulness for any length of time. Pt assisted to comfortable            

      position in bed. . Cardiovascular: Rhythm is sinus rhythm No ectopy. Respiratory:           

      Respiratory effort is even, unlabored, Bi-pap machine in place with respiratory             

      monitoring. Derm: Skin is pink, warm & dry.                                               

11:30 General: Appears ill, Behavior is appropriate for age, cooperative, drowsy. General: Pt kc3 

      assisted with bedpan and back to comfortable position in bed. . Pain: Location: back.       

      Neurological: Level of Consciousness is awake, lethargic, Oriented to person, place.        

      Cardiovascular: Rhythm is sinus rhythm. Respiratory: Airway is patent Respiratory           

      effort is even, unlabored, Bipap in place. Pt maintaining oxygen sats. Derm: Skin is        

      pink, warm & dry.                                                                         

                                                                                                  

Vital Signs:                                                                                      

00:38  / 59; Pulse 70; Resp 22; Temp 97.6(O); Pulse Ox 98% on R/A; Weight 63.5 kg;      jp6 

      Height 5 ft. 1 in. (154.94 cm); Pain 4/10;                                                  

00:45  / 59 (auto/);                                                                    jp6 

00:46 Pulse 70 MON; Pulse Ox 96% ;                                                            jp6 

01:00  / 62 (auto/);                                                                    jp6 

01:01 Pulse 71 MON; Pulse Ox 100% ;                                                           jp6 

01:15  / 59 (auto/);                                                                    jp6 

01:16 Pulse 72 MON; Pulse Ox 100% ;                                                           jp6 

01:30  / 65 (auto/);                                                                    jp6 

01:31 Pulse 68 MON; Pulse Ox 100% ;                                                           jp6 

01:47  / 63 (auto/);                                                                    jp6 

01:47 Pulse 69 MON; Pulse Ox 93% ;                                                            jp6 

02:00  / 60 (auto/);                                                                    jp6 

02:01 Pulse 68 MON; Pulse Ox 93% ;                                                            jp6 

02:15  / 65 (auto/);                                                                    jp6 

02:16 Pulse 70 MON; Pulse Ox 93% ;                                                            jp6 

02:30  / 61 (auto/);                                                                    jp6 

02:31 Pulse 69 MON; Pulse Ox 93% ;                                                            jp6 

02:45  / 74 (auto/);                                                                    jp6 

02:46 Pulse 70 MON; Pulse Ox 91% ;                                                            jp6 

03:00  / 61 (auto/);                                                                    jp6 

03:01 Pulse 71 MON; Pulse Ox 91% ;                                                            jp6 

03:15  / 65 (auto/);                                                                    jp6 

03:16 Pulse 70 MON; Pulse Ox 92% ;                                                            jp6 

03:30  / 65 (auto/);                                                                    jp6 

03:31 Pulse 70 MON; Pulse Ox 86% ;                                                            jp6 

03:45  / 64 (auto/);                                                                    jp6 

03:46 Pulse 68 MON; Pulse Ox 88% ;                                                            jp6 

03:53 Pulse 69 MON; Pulse Ox 98% ;                                                            jp6 

03:58 Pulse 68 MON; Pulse Ox 98% ;                                                            jp6 

04:00  / 64 (auto/);                                                                    jp6 

04:01 Pulse 68 MON; Pulse Ox 99% ;                                                            jp6 

04:15  / 65 (auto/);                                                                    jp6 

04:16 Pulse 68 MON; Pulse Ox 99% ;                                                            jp6 

04:30  / 69 (auto/);                                                                    jp6 

04:31 Pulse 70 MON; Pulse Ox 99% ;                                                            jp6 

04:45  / 67 (auto/);                                                                    jp6 

04:46 Pulse 70 MON; Pulse Ox 99% ;                                                            jp6 

05:00  / 67 (auto/);                                                                    jp6 

05:01 Pulse 70 MON; Pulse Ox 99% ;                                                            jp6 

05:15  / 66 (auto/);                                                                    jp6 

05:16 Pulse 71 MON; Pulse Ox 98% ;                                                            jp6 

05:30  / 70 (auto/);                                                                    jp6 

05:31 Pulse 70 MON; Pulse Ox 96% ;                                                            jp6 

05:45  / 72 (auto/);                                                                    jp6 

05:46 Pulse 69 MON; Pulse Ox 94% ;                                                            jp6 

06:00  / 73 (auto/);                                                                    jp6 

06:01 Pulse 68 MON; Pulse Ox 93% ;                                                            jp6 

06:15  / 68 (auto/);                                                                    jp6 

06:16 Pulse 66 MON; Pulse Ox 95% ;                                                            jp6 

06:26 Temp 97(T);                                                                             jp6 

06:30  / 73 (auto/);                                                                    jp6 

06:31 Pulse 67 MON; Pulse Ox 94% ;                                                            jp6 

06:45  / 69 (auto/);                                                                    jp6 

06:46 Pulse 65 MON; Pulse Ox 94% ;                                                            jp6 

07:00  / 71 (auto/);                                                                    kc3 

07:00 Pulse 66 MON; Pulse Ox 94% ;                                                            kc3 

07:15  / 78 (auto/);                                                                    kc3 

07:16 Pulse 70 MON; Pulse Ox 97% ;                                                            kc3 

07:30  / 70 (auto/);                                                                    kc3 

07:31 Pulse 70 MON; Pulse Ox 93% ;                                                            kc3 

07:45  / 68 (auto/);                                                                    kc3 

07:45 Pulse 70 MON; Pulse Ox 93% ;                                                            kc3 

08:00  / 68 (auto/);                                                                    kc3 

08:00 Pulse 70 MON; Pulse Ox 95% ;                                                            kc3 

08:15  / 68 (auto/);                                                                    kc3 

08:15 Pulse 68 MON; Pulse Ox 96% ;                                                            kc3 

08:30  / 71 (auto/);                                                                    kc3 

08:30 Pulse 69 MON; Pulse Ox 96% ;                                                            kc3 

08:45  / 67 (auto/);                                                                    kc3 

08:45 Pulse 69 MON; Pulse Ox 95% ;                                                            kc3 

09:00  / 68 (auto/);                                                                    kc3 

09:01 Pulse 69 MON; Pulse Ox 96% ;                                                            kc3 

09:15  / 68 (auto/);                                                                    kc3 

09:16 Pulse 69 MON; Pulse Ox 94% ;                                                            kc3 

09:30  / 99 (auto/);                                                                    kc3 

09:31 Pulse 73 MON; Pulse Ox 95% ;                                                            kc3 

09:45  / 71 (auto/);                                                                    kc3 

09:45 Pulse 71 MON; Pulse Ox 93% ;                                                            kc3 

10:00  / 77 (auto/);                                                                    kc3 

10:00 Pulse 69 MON; Pulse Ox 94% ;                                                            kc3 

10:15  / 71 (auto/);                                                                    kc3 

10:15 Pulse 70 MON; Resp 22; Temp 99.4(TE); Pulse Ox 93% ;                                    kc3 

10:30  / 70 (auto/);                                                                    kc3 

10:31 Pulse 72 MON; Pulse Ox 95% ;                                                            kc3 

10:45  / 76 (auto/);                                                                    kc3 

10:46 Pulse 74 MON; Pulse Ox 95% ;                                                            kc3 

11:00  / 70 (auto/);                                                                    kc3 

11:01 Pulse 70 MON; Pulse Ox 96% ;                                                            kc3 

11:15  / 59 (auto/);                                                                    kc3 

11:16 Pulse 74 MON; Pulse Ox 96% ;                                                            kc3 

11:30  / 70 (auto/);                                                                    kc3 

11:30 Pulse 69 MON; Resp 24; Temp 98.8(TE); Pulse Ox 98% ;                                    kc3 

00:38 Body Mass Index 26.45 (63.50 kg, 154.94 cm)                                             jp6 

10:15 pt on bipap                                                                             kc3 

11:30 pt on bipap                                                                             kc3 

                                                                                                  

Vitals:                                                                                           

00:38 Log In Time N/A - ambulance arrival.                                                    jp6 

                                                                                                  

ED Course:                                                                                        

00:18 Patient visited by Jeanine Tillman, Unit Clerk.                                        jlm 

00:18 Ottoniel Griggs is Private Physician.                                                    jlm 

00:18 Fredy Garnett DO is Attending Physician.                                               cs11

00:18 Patient visited by Fredy Garnett DO.                                                   cs11

00:18 Patient moved to [facility] 

00:29 Chelsi Fregoso,RN is Primary Nurse.                                                     jp6 

00:31 Triage Initiated                                                                        jp6 

00:45 The patient / caregiver is instructed regarding the plan of care and ED course. Cardiac jp6 

      monitor on. Pulse ox on. NIBP on.                                                           

00:45 Maintain field IV. Dressing intact. Good blood return noted. Site clean & dry. Gauge &  jp6

      site: 18 gauge left forearm. IV is patent, is intact, is free of redness or swelling.       

      No procedures done that require assistance. Labs/Blood culture drawn.                       

00:47 -Arterial Blood Gas Sent.                                                               jc3 

00:47 O2 via non-rebreather \T\ 15L/min.                                                        jp6 

01:05 Patient visited by Andrea Coyle PCA.                                                     jmv 

01:05 EKG done. (by ED staff). Reviewed by Fredy Garnett DO.                                   jmv 

01:16 -Blood Culture Sent.                                                                    jp6 

01:47 O2 via nasal cannula \T\ 4L/min.                                                          jp6 

02:02 [facility] Payment Agreement was scanned into [employer] and attached to record.               hs2 

02:10 Patient visited by Chelsi Fregoso RN.                                                   jp6 

02:20 Lakshmi Anglin is Hospitalizing Provider.                                             cs11

03:00 Patient placed on CPAP sat's not staying up on Cpap.                                    jp6 

05:09 ARTERIAL BLOOD GAS Sent.                                                                jc3 

06:20 -Arterial Blood Gas Sent.                                                               jc3 

06:26 BIPAP: Full face fask.                                                                  jp6 

06:37 Patient moved to Admit Hold                                                             [id number]

08:06 Written Provider Order was scanned into [employer] and attached to record.                 deg 

08:27 Primary Nurse role handed off by Chelsi Fregoso,RN                                      jjr 

09:02 T-Sheet-- Draft Copy was scanned into [employer] and attached to record.                   [facility] 

                                                                                             

17:42 ECG/EKG was scanned into [employer] and attached to record.                                kf3 

                                                                                                  

Administered Medications:                                                                         

                                                                                             

00:35 Drug: Albuterol-Ipratropium 1 neb [ipratropium-albuterol 0.5 mg-3 mg(2.5 mg base)/3 mL  jc3 

      nebulization soln (1 neb)] Route: Nebulizer;                                                

00:45 Drug: Albuterol-Ipratropium 1 neb [ipratropium-albuterol 0.5 mg-3 mg(2.5 mg base)/3 mL  jc3 

      nebulization soln (1 neb)] Route: Nebulizer;                                                

00:54 Drug: Ondansetron 4 mg [ondansetron HCl 2 mg/mL intravenous solution (2 mL)] Route:     jp6 

      IVP; Site: left forearm;                                                                    

00:55 Drug: Albuterol-Ipratropium 1 neb [ipratropium-albuterol 0.5 mg-3 mg(2.5 mg base)/3 mL  jc3 

      nebulization soln (1 neb)] Route: Nebulizer;                                                

01:15 Drug: Furosemide 60 mg [furosemide 10 mg/mL injection solution (6 mL)] Route: IVP;      jp6 

      Site: left forearm;                                                                         

01:16 Drug: Dexamethasone 15 mg [dexamethasone 4 mg/mL injection solution] Route: IV; Rate:   jp6 

      bolus; Site: left forearm;                                                                  

01:16 Drug: Nitro-Bid 0.5 inches [Nitro-Bid 2 % transdermal ointment (0.5 inches)] Route:     jp6 

      Transdermal; Site: anterior chest wall;                                                     

                                                                                                  

                                                                                                  

Output:                                                                                           

06:26 Urine: 0.00ml; Total: 0.00ml.                                                           jp6 

                                                                                                  

RT:                                                                                               

00:35 Initial Med Neb Given as ordered. O2 via non-rebreather \T\ 15L/min. Respiratory: Airway  jc3 

      is patent Respiratory effort is labored, Use of accessory muscles noted. Respiratory        

      pattern is tachypnea Breath sounds are diminished bilaterally. Breath sounds with           

      wheezes at expiration.                                                                      

00:41 ABG's drawn from right radial artery pressure held for 5 minutes no bleeding noted      jc3 

      pressure bandage applied specimen sent pt. tolerated well.                                  

00:54 Subsequent Med Neb Given as ordered Patient tolerated procedure well without adverse    jc3 

      effect. O2 via Pre ABG, Patient on Room Air. SpO2 - 97%. Oxygen is room air.                

00:59 Subsequent Med Neb Given as ordered Patient tolerated procedure well without adverse    jc3 

      effect. Respiratory: Breath sounds are coarse Breath sounds are diminished bilaterally.     

      Breath sounds with wheezes at expiration.                                                   

03:49 O2 via non-rebreather \T\ 15L/min.                                                        jc3 

03:49 O2 via Patient placed on her own CPAP with O2. SpO2 would not go above 86% with 15L O2  jc3 

      inline. Put patient on Non-rebreather.                                                      

05:09 ABG's drawn from right brachial artery pressure held for 5 minutes no bleeding noted    jc3 

      pressure bandage applied specimen sent pt. tolerated well. O2 via non-rebreather \T\          

      15L/min.                                                                                    

05:11 O2 via Venturi mask \T\ 15L/min - 50%.                                                    jc3 

05:32 BIPAP: Inspiratory Pressure: 16, Expiratory Pressure: 8, Backup Rate: 8, FiO2: 40%,     jc3 

      Full face fask.                                                                             

06:20 ABG's drawn from left radial artery pressure held for 5 minutes no bleeding noted       jc3 

      pressure bandage applied specimen sent pt. tolerated well.                                  

                                                                                                  

Order Results:                                                                                    

Lab Order: -Arterial Blood Gas; SPEC'M 17 00:45                                             

      Test: ABG pH (ARTERIAL); Value: 7.257; Range: 7.350-7.450; Abnormal: Below low normal;      

      Units: UNITS; Status: F                                                                     

      Test: ABG PARTIAL PRESSURE CO2; Value: 57.4; Range: 35.0-45.0; Abnormal: Above high         

      normal; Units: mmHg; Status: F                                                              

      Test: ABG PARTIAL PRESSURE O2; Value: 168.2; Range: 75.0-100.0; Abnormal: Above high        

      normal; Units: mmHg; Status: F                                                              

      Test: ABG TOTAL CO2; Value: 26.8; Range: 23.0-31.0; Units: MEQ/L; Status: F                 

      Test: ABG HCO3; Value: 25.0; Range: 22.0-26.0; Units: MEQ/L; Status: F                      

      Test: ABG BASE EXCESS; Value: -2.7; Range: -2.0-2.0; Abnormal: Below low normal;            

      Status: F                                                                                   

      Test: ABG STANDARD HCO3; Value: 22.2; Range: 22.0-26.0; Units: MEQ/L; Status: F             

      Test: ABG O2 SATURATION; Value: 99.0; Range: 95.0-99.0; Units: %; Status: F                 

      Test: ABG DEVICE; Value: NASAL PEACE; Status: F                                              

Lab Order: Lactic Acid (Gray tube on ice); SPEC'M 17 00:31                                  

      Test: LACTIC ACID LEVEL, LACTATE; Value: 0.4; Range: 0.4-2.0; Units: MMOL/L; Status: F      

Lab Order: CBC with Diff; SPEC17 00:31                                                   

      Test: WHITE BLOOD COUNT; Value: 6.4; Range: 4.0-10.0; Units: K/mm3; Status: F               

      Test: RED BLOOD COUNT; Value: 4.09; Range: 4.00-5.40; Units: M/mm3; Status: F               

      Test: HEMOGLOBIN; Value: 11.1; Range: 12.0-16.0; Abnormal: Below low normal; Units:         

      g/dl; Status: F                                                                             

      Test: HEMATOCRIT; Value: 36.3; Range: 36.0-47.0; Units: %; Status: F                        

      Test: MEAN CORPUSCULAR VOLUME; Value: 88.7; Range: 80.0-96.0; Units: fl; Status: F          

      Test: MEAN CORPUSCULAR HEMOGLOBIN; Value: 27.2; Range: 27.0-33.0; Units: pg; Status: F      

      Test: MEAN CORPUSCULAR HGB CONC; Value: 30.7; Range: 32.0-36.5; Abnormal: Below low         

      normal; Units: g/dl; Status: F                                                              

      Test: RED CELL DISTRIBUTION WIDTH; Value: 14.7; Range: 11.5-14.5; Abnormal: Above high      

      normal; Units: %; Status: F                                                                 

      Test: PLATELET COUNT, AUTOMATED; Value: 77; Range: 150-450; Abnormal: Below low normal;     

      Units: k/mm3; Status: F                                                                     

      Test: NEUTROPHILS %; Value: 76.8; Range: 36.0-66.0; Abnormal: Above high normal; Units:     

      %; Status: F                                                                                

      Test: LYMPH %; Value: 11.1; Range: 24.0-44.0; Abnormal: Below low normal; Units: %;         

      Status: F                                                                                   

      Test: MONO %; Value: 7.0; Range: 0.0-5.0; Abnormal: Above high normal; Units: %;            

      Status: F                                                                                   

      Test: EOS %; Value: 2.1; Range: 0.0-3.0; Units: %; Status: F                                

      Test: BASO %; Value: 0.4; Range: 0.0-1.0; Units: %; Status: F                               

      Test: LARGE UNSTAINED CELL %; Value: 2.6; Range: 0.0-4.0; Units: %; Status: F               

      Test: NEUTROPHILS #; Value: 4.9; Range: 1.8-7.7; Units: K/mm3; Status: F                    

      Test: LYMPH #; Value: 0.7; Range: 1.5-4.5; Abnormal: Below low normal; Units: K/mm3;        

      Status: F                                                                                   

      Test: MONO #; Value: 0.5; Range: 0.0-0.8; Units: K/mm3; Status: F                           

      Test: EOS #; Value: 0.1; Range: 0.0-0.50; Units: K/mm3; Status: F                           

      Test: BASO #; Value: 0.0; Range: 0.0-0.2; Units: K/mm3; Status: F                           

      Test: LARGE UNSTAINED CELL #; Value: 0.2; Range: 0.0-0.4; Units: K/mm3; Status: F           

Lab Order: MED Profile; SPEC'M 17 00:31                                                     

      Test: GLUCOSE, FASTING; Value: 79; Range: ; Abnormal: Below low normal; Units:        

      MG/DL; Status: F                                                                            

      Test: BLOOD UREA NITROGEN; Value: 36; Range: 7-18; Abnormal: Above high normal; Units:      

      MG/DL; Status: F                                                                            

      Test: CREATININE FOR GFR; Value: 2.42; Range: 0.55-1.02; Abnormal: Above high normal;       

      Units: MG/DL; Status: F                                                                     

      Test: GLOMERULAR FILTRATION RATE; Value: 21.4; Range: >45; Abnormal: Below low normal;      

      Status: F                                                                                   

      Test: SODIUM LEVEL; Value: 146; Range: 136-145; Abnormal: Above high normal; Units:         

      MEQ/L; Status: F                                                                            

      Test: POTASSIUM SERUM; Value: 3.6; Range: 3.5-5.1; Units: MEQ/L; Status: F                  

      Test: CHLORIDE LEVEL; Value: 108; Range: ; Abnormal: Above high normal; Units:        

      MEQ/L; Status: F                                                                            

      Test: CARBON DIOXIDE LEVEL; Value: 31; Range: 21-32; Units: MEQ/L; Status: F                

      Test: ANION GAP; Value: 7; Range: 8-16; Abnormal: Below low normal; Units: MEQ/L;           

      Status: F                                                                                   

      Test: CALCIUM LEVEL; Value: 8.7; Range: 8.8-10.2; Abnormal: Below low normal; Units:        

      MG/DL; Status: F                                                                            

      Test Note: &nbsp;; Units are mL/min/1.73 m2 Chronic Kidney Disease Staging per NKF:       

      Stage I & II GFR >=60 Normal to Mildly Decreased Stage III GFR 30-59 Moderately           

      Decreased Stage IV GFR 15-29 Severely Decreased Stage V GFR <15 Very Little GFR Left        

      ESRD GFR <15 on RRT                                                                         

Lab Order: Cardiac Marker Panel; SPEC 17 00:31                                            

      Test: CPK CREATINE PHOSPHOKINASE; Value: 91; Range: ; Units: U/L; Status: F           

      Test: CK-MB VALUE MASS; Value: 2.2; Range: 0.0-3.6; Units: NG/ML; Status: F                 

      Test: MB/CK RELATIVE INDEX; Value: 2.41; Range: < OR =4; Status: F                          

      Test: TROPONIN I; Value: < 0.02; Range: < 0.10; Units: NG/ML; Status: F                     

      Test Note: &nbsp;; DIAGNOSIS CRITERIA MMB ng/ml Relative Index (RI) NON-AMI < or = 5      

      N/A GRAY ZONE > 5 < or = 4 AMI > 5 > 4                                                      

Lab Order: BNP; SPEC17 00:31                                                             

      Test: BRAIN NATRIURETIC PEPTIDE; Value: 2210; Range: <100; Abnormal: Above high normal;     

      Units: PG/ML; Status: F                                                                     

Lab Order: CARDIAC MARKER PANEL; SPEC 17 06:16                                            

      Test: CPK CREATINE PHOSPHOKINASE; Value: 76; Range: ; Units: U/L; Status: F           

      Test: CK-MB VALUE MASS; Value: 1.7; Range: 0.0-3.6; Units: NG/ML; Status: F                 

      Test: MB/CK RELATIVE INDEX; Value: 2.23; Range: < OR =4; Status: F                          

      Test: TROPONIN I; Value: < 0.02; Range: < 0.10; Units: NG/ML; Status: F                     

      Test Note: &nbsp;; DIAGNOSIS CRITERIA MMB ng/ml Relative Index (RI) NON-AMI < or = 5      

      N/A GRAY ZONE > 5 < or = 4 AMI > 5 > 4                                                      

Lab Order: ARTERIAL BLOOD GAS; SPEC' 17 04:55                                              

      Test: ABG pH (ARTERIAL); Value: 7.208; Range: 7.350-7.450; Abnormal: Critical Low;          

      Units: UNITS; Status: F                                                                     

      Test: ABG PARTIAL PRESSURE CO2; Value: 71.3; Range: 35.0-45.0; Abnormal: Above upper        

      panic limits; Units: mmHg; Status: F                                                        

      Test: ABG PARTIAL PRESSURE O2; Value: 98.7; Range: 75.0-100.0; Units: mmHg; Status: F       

      Test: ABG TOTAL CO2; Value: 29.9; Range: 23.0-31.0; Units: MEQ/L; Status: F                 

      Test: ABG HCO3; Value: 27.7; Range: 22.0-26.0; Abnormal: Above high normal; Units:          

      MEQ/L; Status: F                                                                            

      Test: ABG BASE EXCESS; Value: -1.4; Range: -2.0-2.0; Status: F                              

      Test: ABG STANDARD HCO3; Value: 23.3; Range: 22.0-26.0; Units: MEQ/L; Status: F             

      Test: ABG O2 SATURATION; Value: 96.5; Range: 95.0-99.0; Units: %; Status: F                 

Lab Order: -Arterial Blood Gas; SPEC' 17 06:20                                             

      Test: ABG pH (ARTERIAL); Value: 7.213; Range: 7.350-7.450; Abnormal: Critical Low;          

      Units: UNITS; Status: F                                                                     

      Test: ABG PARTIAL PRESSURE CO2; Value: 66.2; Range: 35.0-45.0; Abnormal: Above upper        

      panic limits; Units: mmHg; Status: F                                                        

      Test: ABG PARTIAL PRESSURE O2; Value: 84.9; Range: 75.0-100.0; Units: mmHg; Status: F       

      Test: ABG TOTAL CO2; Value: 28.1; Range: 23.0-31.0; Units: MEQ/L; Status: F                 

      Test: ABG HCO3; Value: 26.1; Range: 22.0-26.0; Abnormal: Above high normal; Units:          

      MEQ/L; Status: F                                                                            

      Test: ABG BASE EXCESS; Value: -2.8; Range: -2.0-2.0; Abnormal: Below low normal;            

      Status: F                                                                                   

      Test: ABG STANDARD HCO3; Value: 22.1; Range: 22.0-26.0; Units: MEQ/L; Status: F             

      Test: ABG O2 SATURATION; Value: 94.8; Range: 95.0-99.0; Abnormal: Below low normal;         

      Units: %; Status: F                                                                         

      Test: ABG DEVICE; Value: NASAL PEACE; Status: F                                              

Lab Order: ARTERIAL BLOOD GAS; SPEC'17 10:02                                              

      Test: ABG pH (ARTERIAL); Value: 7.253; Range: 7.350-7.450; Abnormal: Below low normal;      

      Units: UNITS; Status: F                                                                     

      Test: ABG PARTIAL PRESSURE CO2; Value: 54.0; Range: 35.0-45.0; Abnormal: Above high         

      normal; Units: mmHg; Status: F                                                              

      Test: ABG PARTIAL PRESSURE O2; Value: 79.7; Range: 75.0-100.0; Units: mmHg; Status: F       

      Test: ABG TOTAL CO2; Value: 25.0; Range: 23.0-31.0; Units: MEQ/L; Status: F                 

      Test: ABG HCO3; Value: 23.3; Range: 22.0-26.0; Units: MEQ/L; Status: F                      

      Test: ABG BASE EXCESS; Value: -4.2; Range: -2.0-2.0; Abnormal: Below low normal;            

      Status: F                                                                                   

      Test: ABG STANDARD HCO3; Value: 20.9; Range: 22.0-26.0; Abnormal: Below low normal;         

      Units: MEQ/L; Status: F                                                                     

      Test: ABG O2 SATURATION; Value: 94.8; Range: 95.0-99.0; Abnormal: Below low normal;         

      Units: %; Status: F                                                                         

Lab Order: Fingerstick Blood Sugar; SPEC'17 07:50                                         

      Test: BEDSIDE GLUCOSE; Value: 125; Range: ; Abnormal: Above high normal; Units:       

      MG/DL; Status: F                                                                            

      Test Note: &nbsp;; Insulin Coverage Ord RN Notified                                       

                                                                                                  

Outcome:                                                                                          

02:20 Decision to Hospitalize by Provider.                                                    cs11

10:24 No special radiology studies were completed.                                            3 

11:32 Discharge Assessment: patient administered narcotics - no. The following High Risk      [facility] 

      Discharge criteria are identified: None. Admitted to ICU accompanied by nurse,              

      accompanied by tech, via stretcher, with oxygen, on monitor, with chart. critical.          

      Admission hand-off: Report called to Marco, ICU RN. Property :Personal belongings             

      accompany Pt.                                                                               

11:35 Patient left the ED.                                                                    [facility] 

                                                                                                  

Signatures:                                                                                       

Sabiha Olivarez, Unit Clerk              Unit deg                                                  

Kinza Perea, RN                     RN   kmg1                                                 

Glenn Calvo, Reg                      Reg  kf3                                                  

Chelsi Villa, RN                    RN   Rohit Harrington                               jc3                                                  

Ragini Wright, GREGG                    RN   hs1                                                  

Fredy Garnett, DO                       DO   cs11                                                 

Jeanine Tillman, Unit Clerk            Unit Donya Sifuentes,RN                          RN   kc3                                                  

Christa Barrera, Reg                   Reg  hs2                                                  

Chelsi Fregoso,RN                       RN   jp6                                                  

Basia Chicas Jose, FELIPA                         PCA  jmv                                                  

                                                                                                  

**************************************************************************************************



*** Chart Complete ***
MTDD

## 2017-01-31 NOTE — IPN
DATE OF SERVICE:  01/31/2017

 

A 66-year-old female seen at bedside.  No overnight issues reported.  She

continues use the table top bilevel positive airway pressure (BiPAP)  and feels

her breathing is minimally improved today.  She has a nonproductive cough.  No

fevers, chills.  No nausea or vomiting.

 

OBJECTIVE:

Temperature 97.2, pulse 64, respiratory rate is 22, blood pressure 155/75, SpO2

98% on 2 liters.

HEENT:  Head is atraumatic, normocephalic.  Eyes:  Pupils equal, round, reactive

to light and accommodation.  Throat clear.  Mucous membranes moist.

NECK:  Unable to determine jugular venous distention (JVD) due to body habitus.

LUNGS:  Crackles throughout.  Expiratory wheeze noted.  Decreased bibasilar

breath sounds.

HEART:  Regular rate and rhythm.

ABDOMEN:  Obese, soft.

EXTREMITIES:  Some mild edema.  No calf tenderness.

 

LABORATORY DATA:

White count 4.8, hemoglobin 10.3 and platelets 87,000.

 

Sodium 142, potassium 3.6, chloride 104, bicarbonate 30, anion gap 8, BUN is

2.61, glucose 184, magnesium 2.2.

 

ASSESSMENT AND PLAN:

1.  Acute on chronic diastolic congestive heart failure with extensive cardiac

history.  Three-vessel disease status post coronary artery bypass graft (CABG)

procedure in 2012 after an myocardial infarction (MI).  Last transesophageal

echocardiogram (BRANNON) showed a left ventricular ejection fraction (LVEF) of

50%-55% with diastolic failure and pulmonary hypertension.  I did request Dr. Cantor to see the patient on consult to be sure that we have her maximized with

diuretic.  She continues with daily weights, strict intake and output (I and O)

and Lasix daily.  Continue aspirin, Coreg and statin.

 

2.  History of obstructive sleep apnea (ALONZO).  Appreciate Dr. Cummings's assistance

with her bilevel positive airway pressure (BiPAP) therapy.  She is currently on

her home setting and we will go ahead and plan on downgrading her to progressive

care unit (PCU).

 

3.  Chronic kidney disease (CKD) stage IV.  Creatinine appears to be at baseline.

I will continue Lasix.

 

4.  Insulin-dependent diabetes.  Chronic.  Will continue with insulin regimen,

fingersticks before meals and at bedtime and sliding-scale coverage.

 

5.  Thrombocytopenia.  This is chronic in nature.  We will monitor her platelets.

Will continue with thromboembolitic deterrents (TEDs) and sequentials in place of

heparin products for deep venous thrombosis (DVT) prophylaxis.

 

6.  History of cirrhosis.  This is chronic.  Continue with current management.

She does not shown any signs of bleeding.  No signs of metabolic cephalopathy

 

7.  Gastroesophageal reflux disease (GERD).  Stable on proton pump inhibitor

(PPI).

 

8.  History of seizures.  Chronic.  Continue current medications.

 

9.  Dyslipidemia.  Continue statin therapy.

 

10.  Deep venous thrombosis (DVT) prophylaxis.  Thromboembolitic deterrents

(TEDs) and sequentials.

HISTORY

Seizures chronic continue current meds.

 

11.  Venous thromboembolism (VTE) prophylaxis.  Thromboembolitic deterrents

(TEDs) and sequentials.

 

DISPOSITION:  Continue with physical therapy.  We will continue to try to titrate

her oxygen requirements down.  Appreciate Dr. Cummings's input and request that Dr. Cantor to see the patient regarding her congestive heart failure as well as

volume status.

## 2017-02-01 NOTE — IPN
DATE:  02/01/2017

 

Mrs. Jarrett had a relatively uneventful night.  She tells me that she was able to

sleep without major difficulty with her CPAP on.  This morning, she still feels

she is short of breath even at rest, but she feels that overall there has been

improvement.

 

Blood pressure 167/76.  Heart rate is in 60s and 70s sinus rhythm.  She does have

episodes of frequent ventricular ectopy, including brief runs of idioventricular

rhythm.  Saturation is in the mid 90s on 30% FiO2 on her BiPAP.  Her jugular

venous pulse (JVP) is very difficult to .  It does not appear grossly

elevated on my exam, but in people with lung disease, it is often challenging.

Lungs sound though remarkably clear.  I do not appreciate any crackle, wheezing

or rhonchi.  Air movement is fair.  Heart exam regular rhythm, somewhat muffled

heart sounds.  I do not appreciate any gallop, rub or murmur.  Abdomen is soft.

No shifting dullness.  There is no significant peripheral edema.  Neurologically,

she is intact.

 

LABORATORY DATA:  Basic metabolic panel:  Potassium 3.2, BUN 81, creatinine 2.5

for GFR of 20, and glucose 375.  CBC reveals hemoglobin of 10.2 hematocrit 33 and

platelet count is 82,000.

 

ASSESSMENT AND PLAN:  Mrs. Jarrett is a 66-year-old lady who came in with

respiratory failure.  Even though I do not see truly convincing evidence for

volume overload by physical exam, by history and by chest x-ray, it most likely

does have a strong component of congestive heart failure.  Management is further

complicated by underlying stage IV renal insufficiency.  Yesterday after

receiving single high dose of furosemide, she developed negative balance and I am

going to give her an additional dose today.  Simultaneously her renal function

stayed about the same or actually slightly improved.  We will have to reevaluate

her on a daily basis in this respect.  Potassium will be supplemented.

Otherwise, her remaining issues have been stable.  The ventricular ectopy should

not be surprising in this situation.

## 2017-02-02 NOTE — IPNPDOC
[facility] Cardiology Progress Note


Date of Service/Time


The patient was seen on 2/2/17 at 09:24.





Cardiology Progress Note


SUBJECTIVE: The patient states that she feels better today, she had just taken 

her CPAP off and was resting comfortably in bed, sitting upright and watching 

television. She denied any CP, SOB or palpitations.  





OBJECTIVE: 67 y/o  female of stated age sitting upright in her 

hospital bed, non distressed and non labored. Comfortable, watching television. 








PHYSICAL EXAMINATION:


VITAL SIGNS: Please see below.


GENERAL APPEARANCE: Patient appears comfortable, not labored, resting in bed 

sitting upright.


HEENT: NCAT, EOMI, nares patent bilaterally, tongue midline, moist mucous 

membranes.


LUNGS: Improved breath sounds bilaterally, no crackles, rales, rhonchi 

appreciated. Good air expansion bilaterally


HEART: Normal S1, S2, no murmurs, rubs, gallops appreciated with rate in the 

60s to 70s.


ABDOMEN: Soft and nondistended


SKIN: Intact


EXTREMITIES: No clubbing, cyanosis, edema


NEUROLOGICAL: No focal deficits appreciated


PSYCHIATRIC: Affect is appropriate





LABORATORY WORK: Please see below.





ASSESSMENT AND PLAN:





Miss Jarrett seems to be doing well this morning, she had no active complaints 

and stated that she was not short of breath. She denied chest pain or 

palpitations. When examined at bedside this morning she was off of her CPAP 

therapy which had just been removed and she seemed to be tolerating this well. 

She was able to converse with us without shortness of breath. She states that 

she feels that she is back to her baseline level of health. Her creatinine went 

from 2.53 down to 2.43, will administer another 120 mg of Lasix today, note 

that she was -875 mL over the past 24 hours. We will give diuretic therapy 

after supplementing her potassium which was 3.2 today. She has a follow-up 

appointment with nephrology next week. On physical exam her lungs sounded clear

, no crackles were appreciated, she also did not have appreciable JVD.  No 

further recommendations at this time





Vital Signs/I&O


VS/I&O





 Vital Signs








  Date Time  Temp Pulse Resp B/P Pulse Ox O2 Delivery O2 Flow Rate FiO2


 


2/2/17 09:04  62  128/61    


 


2/2/17 04:00      BIPAP/CPAP 2.0 


 


2/2/17 00:00 98.0  20  95   


 


2/1/17 06:16        30














 I&O- Last 24 Hours up to 6 AM 


 


 2/2/17





 06:00


 


Intake Total 720 ml


 


Output Total 2150 ml


 


Balance -1430 ml











Laboratory Data


24H LABS


 Laboratory Tests 2


2/1/17 11:37: Bedside Glucose (Misc Panel) 251H


2/1/17 17:29: Bedside Glucose (Misc Panel) 242H


2/1/17 21:03: Bedside Glucose (Misc Panel) 356H


2/2/17 04:56: 


Anion Gap 8, White Blood Count 4.2, Red Blood Count 4.07, Hemoglobin 10.7L, 

Hematocrit 34.9L, Mean Corpuscular Volume 85.6, Mean Corpuscular Hemoglobin 

26.4L, Mean Corpuscular Hemoglobin Concent 30.8L, Red Cell Distribution Width 

14.5, Platelet Count 88L, Neutrophils (%) (Auto) 80.5H, Lymphocytes (%) (Auto) 

11.9L, Monocytes (%) (Auto) 5.4H, Eosinophils (%) (Auto) 0.2, Basophils (%) (

Auto) 0.2, Neutrophils # (Auto) 3.4, Lymphocytes # (Auto) 0.5L, Monocytes # (

Auto) 0.2, Eosinophils # (Auto) 0.0, Basophils # (Auto) 0.0, Blood Urea 

Nitrogen 81H, Creatinine 2.43H, Sodium Level 140, Potassium Level 3.2L, 

Chloride Level 102, Carbon Dioxide Level 30, Calcium Level 8.3L, Glomerular 

Filtration Rate 21.3L, Large Unclassified Cells # 0.1, Large Unclassified Cells 

% 1.8, Magnesium Level 2.0


CBC/BMP


 Laboratory Tests


2/2/17 04:56








Calcium Level 8.3 L, Red Blood Count 4.07, Mean Corpuscular Volume 85.6, Mean 

Corpuscular Hemoglobin 26.4 L, Mean Corpuscular Hemoglobin Concent 30.8 L, Red 

Cell Distribution Width 14.5, Neutrophils (%) (Auto) 80.5 H, Lymphocytes (%) (

Auto) 11.9 L, Monocytes (%) (Auto) 5.4 H, Eosinophils (%) (Auto) 0.2, Basophils 

(%) (Auto) 0.2, Neutrophils # (Auto) 3.4, Lymphocytes # (Auto) 0.5 L, Monocytes 

# (Auto) 0.2, Eosinophils # (Auto) 0.0, Basophils # (Auto) 0.0


FSBS





Laboratory Tests








Test


  2/1/17


11:37 2/1/17


17:29 2/1/17


21:03 Range/Units


 


 


Bedside Glucose (Misc Panel) 251 242 356   MG/DL








Microbiology





 Microbiology


1/28/17 Blood Culture - Final, Complete


          NO GROWTH AFTER 5 DAYS


1/28/17 MRSA Screen - Final, Complete


          Staph.aureus Methicillin Resis





GME ATTESTATION


GME ATTESTATION


My preceptor for this patient encounter was physically present in the building 

during the encounter and was fully available. As needed, all aspects of the 

patient interview, examination, medical decision making process, and medical 

care plan development were reviewed and approved by the preceptor. Preceptor is 

aware and concurs with the plan as stated in the body of this note and will 

attest to such by his/her cosignature.








MARCE BALTAZAR DO Feb 2, 2017 09:45

## 2017-02-02 NOTE — IPN
DATE:  02/02/2017

 

66-year-old female seen at bedside resting comfortably.  She is visiting with 
her

family.  She feels that her breathing is much improved and states that Dr. Cantor

is anticipating that we will be ready to discharge her tomorrow.  She has

diuresed well.  No chest pain.  No productive sputum cough.  No nausea or

vomiting.

 

OBJECTIVE:

Temperature is 98.0, pulse 61, respiratory rate is 18, blood pressure 147/70,

SpO2 is 94% on 2 liters.

GENERAL:  The patient appears to be in no acute distress, is alert and oriented.

HEENT:  Head is atraumatic, normocephalic.  Eyes pupils equal, round and 
reactive

to light and accommodation.  Throat clear.

LUNGS:  Diminished bibasilar breath sounds, occasional wheeze clears with cough.

HEART:  Regular rate and rhythm.

ABDOMEN:  Soft, obese.

EXTREMITIES:  No edema.  No calf tenderness.

 

LABORATORY DATA:  White count is 140, potassium 3.2, which was supplemented by

cardiology today.  Chloride is 102, bicarb 30, anion gap 8, BUN is 81, 
creatinine

2.43, glucose is 253.

 

ASSESSMENT/PLAN:

1.  Acute on chronic diastolic congestive heart failure with an EF 50-55%.

  She is diuresing well.  Appreciate Dr. Cantor's input.

Will continue with aspirin, Coreg and statin.  Will anticipate discharging her

tomorrow if she  continues to do well.

 

2.  History of obstructive sleep apnea.  She has home BiPAP and doing well.

 

3.  Chronic kidney disease, stage IV.  Appears to be at baseline.

 

4.  Insulin.  Continue with insulin regimen.  Fingersticks before meals and at

bedtime.

 

5.  Thrombocytopenia as previously indicated.  Will continue to follow her

platelets.  They do appear to be stable.

 

6.  Cirrhosis.  Chronic.  Continue with current management.

 

7.  Gastroesophageal reflux disease (GERD).  Stable on proton pump inhibitor

(PPI).

 

8.  History of seizures.  No change in medications.

 

9.  Dyslipidemia.  Continue statin therapy.

 

10.  Deep vein thrombosis (DVT) prophylaxis.  Continue with thromboembolic

deterrent stockings (TEDS) and sequential compression devices (SCD).

 

DISPOSITION:  Will anticipate home discharge tomorrow.  Will work with patient

and family services (PFS) to see if there are any barriers to her discharge.

EMELIA

## 2017-02-02 NOTE — IPN
DATE: 02/01/2017

 

66-year-old female seen at bedside.  No overnight issues.  Feels like she is

breathing slightly better today and will try to wean her on her oxygen

requirements.

 

OBJECTIVE:

Temperature is 96.2, pulse 71, respiratory rate 22, blood pressure 164/68, sPO2

is 91% on 2 liters nasal cannula.

General:  The patient appears in no acute distress.  She is alert.

HEENT is atraumatic, normocephalic.  Eyes:  Pupils equal, round, reactive to

light and accommodation. Jugular venous distention difficult to estimate due to

body habitus.  Lungs:  Diminished bibasilar breath sounds.  No wheeze.  No

crackles.

Heart:  Regular rate and rhythm.

Abdomen is obese, soft.

Extremities:  Some mild edema distally. No calf tenderness.

 

LABORATORY DATA:

White count is 3.5, hemoglobin 10.2, platelets are 82,000. Sodium 140, potassium

3.2, which we will supplement, chloride 101, bicarb 29, anion gap 10, BUN is 81,

creatinine 2.53, glucose is 375, magnesium 1.9.

 

ASSESSMENT/PLAN:

1.  Acute on chronic diastolic congestive heart failure with extensive cardiac

history.  Appreciate Dr. Cantor's assistance with maximizing diuretics. She does

have a previous echo with an LV ejection fraction of 50 to 55%.  We will plan on

continuing with aspirin, Coreg statin and again appreciate Dr. Cantor's

assistance with the diuretics.  Continue with daily weights, strict Is and Os.

 

2.  History of obstructive sleep apnea (ALONZO). She does use bilevel positive

airway pressure at night.  Will continue to monitor.

 

3.  Chronic kidney disease (CKD), stage IV: Creatinine at baseline.  Continue

Lasix.

 

4.  Diabetes insulin dependent:  Continue with fingersticks in the morning and at

bedtime sliding scale coverage as well as consistent carbohydrate diet.

 

5.  Thrombocytopenia, stable.  Will monitor platelets.  Continue with TEDS and

sequentials for deep venous thrombosis prophylaxis.

 

6.  History of cirrhosis:  No change in current management.  No signs of

metabolic encephalopathy

 

7.  Gastroesophageal reflux disease:  Stable on proton pump inhibitor (PPI).

 

8.  History of seizures chronic:  Continue current medicines.

 

9.  Dyslipidemia.  Continue statin.

 

10.  Deep venous thrombosis: TEDS and SCDs.

 

DISPOSITION:

Will continue with physical therapy. Try to titrate her oxygen requirements.

Appreciate Dr. Cantor's input.

## 2017-02-03 NOTE — IPN
DATE:  02/03/2017

 

Mrs. Jarrett tells me that she is feeling much better and she would like to go

home.  She slept well with her BiPap.  She was able to ambulate around intensive

care unit (ICU) in her room and also outside of the room without difficulty with

the help of her walker.  She tells me that she feels much better compared to

prior to coming to the hospital.

 

Vital signs: Blood pressure 147/71, heart rate is in 60s.  Saturation is in the

mid 90s on room air.  Fluid balance yesterday was documented about half a liter

negative.  Weight is not documented as yet this morning.  She is alert and

oriented and appropriate.  Her jugular venous pressure does not appear elevated

on my exam.  Lungs are clear to auscultation with much improved air movement.

Heart exam reveals regular rhythm.  I do not appreciate any gallop. There is

faint murmur at the base.  Abdomen is soft.  No tenderness.  No peripheral edema.

Neurologically, she is intact.

 

LABORATORY:

Hemoglobin 11, hematocrit 35, platelet count 94,000.

 

Basic metabolic panel:  Potassium 3.7, BUN 86, creatinine 2.3 for GFR 22 and

glucose 227.

 

ASSESSMENT/PLAN:

Mrs. Jarrett is a 66-year-old female who came with respiratory failure that was

principally a consequence of acute on chronic diastolic congestive heart failure

in the setting of stage IV renal insufficiency and underlying liver cirrhosis.

She did improve with use of noninvasive supportive ventilation and diuresis.  At

this point, I believe she can be discharged home in accordance with her desires.

She was at her baseline on 80 mg of torsemide administered in a twice a day

fashion.  I believe that she needs a little more diuretics going home, and I

would probably give her 100 mg of torsemide daily with supplemental potassium 40

mEq daily.  I had a discussion with her about necessity to stay away from salt as

much as possible, and she tells me that she will obtain a scale and will weigh

herself on a daily basis.  We will make sure that she gets followup next week,

either with her nephrologist or with myself.

## 2017-02-03 NOTE — DSES
DATE OF ADMISSION:  01/28/2017

DATE OF DISCHARGE:  02/03/2017

 

PRIMARY CARE PROVIDER:  Lakshmi Briggs NP

 

CONSULTANTS.  Dr. Cantor

 

PROCEDURES PERFORMED:

 

COMPLICATIONS:  None.

 

ADMISSION/DISCHARGE DIAGNOSES:

1.  Acute shortness of breath with Acute Hypercarbnic Respiratory Failure on 
admission.

2.  Acute on chronic diastolic heart failure with decompensation

3.  Chronic obstructive pulmonary disease (COPD) exacerbation.

4.  Chronic kidney disease (CKD) stage IV, at baseline.

5.  Diabetes.

6. Obstructive sleep apnea.

7.  Chronic thrombocytopenia.

8.  Liver cirrhosis.

9.  Gastroesophageal reflux disease (GERD).

10.  History of seizures.

 

BRIEF HOSPITAL COURSE:  Ms. Jarrett is a pleasant, 66-year-old female who 
presented

to the emergency department on 01/20/2017 with increasing difficulty with

breathing, increasing shortness of breath for approximately a week.  Had been

seen at [facility] emergency department with increasing chest pressure and dyspnea

on exertion.  Was given a dose of Lasix.  Felt that she was safe to discharge

home.  However, she had deteriorated at home during the night and brought to the

emergency department by ambulance.  She did have a one-time episode of vomiting

in the emergency department.  While there, she had respiratory rate of 29, SPO2 
in the 70's and abg showed pH 7.208 with pCO2 71.3; indicating acute 
hypercarbnic respiratory failure. Also, she was felt to be volume overloaded

as well as COPD exacerbation.  Was given Lasix, Zofran, nebulizer treatment,

started on dexamethasone and did feel somewhat better, but felt that she would 
be

better admitted and further evaluated by the hospitalist group.  Her labs did

indicate a brain natriuretic peptide (BNP) of greater than 2000.  White count 
was

6.4.  Chemistry showed a sodium 146, potassium 3.6, chloride 108, bicarbonate 
31.

Her BUN was 36, creatinine 2.42.  At any rate, she was admitted to the intensive

care unit (ICU).  She did require some bilevel positive airway pressure (BiPAP)

for assistance, further aggressive diuresis and Dr. Cummings was consulted briefly
,

who did not feel that she needed any further pulmonary intervention.  She is on

BiPAP at home.  What was required in the ICU matched her home settings for BiPAP
,

and Dr. Cantor was consulted and felt that the patient needed to be diuresed

further to assist with her exacerbation of her CHF.  At any rate, she did

gradually improve.  On day of discharge, she was felt to be back to her baseline

and appropriate for home discharge with close followup with her primary care

provider.  For further information regarding intake, physical, labs, diagnostics
,

please refer to the history and physical and refer to Dr. Cantor and Dr. Cummings'
s

consult notes.

 

PHYSICAL EXAMINATION:

Today, temperature is 90.2, pulse 61, respiratory rate is 20, blood pressure (BP
)

is 137/61, SpO2 is 90% on room air.

HEENT:  Unremarkable.  Unable to determine jugular venous distention (JVD) due 
to

body habitus.

Lungs:  Diminished bibasilar breath sounds, occasional wheeze clears with cough.

 

Heart:  Regular rate and rhythm.

Abdomen is soft, obese, nontender.

Extremities:  No edema.  No calf tenderness.

 

LABORATORY DATA:  White count 3.5, hemoglobin is 11, platelets are 94,000.

Sodium is 141, potassium 3.7, chloride 104, bicarbonate 29, anion gap 8, BUN is

86, creatinine 2.34, glucose is 227, calcium 8.8, and magnesium 1.9.

 

Discharge condition is good.

 

DISPOSITION:  Discharge to home with appropriate followup.  [facility]

referral was placed.

 

DISCHARGE MEDICATIONS:

- doxycycline 100 mg twice a day

- albuterol inhaler as directed

- albuterol nebulizer as directed

- Norvasc 10 mg daily

- aspirin 325 mg daily

- Lipitor 40 mg daily

- Carvedilol 25 mg twice a day

- vitamin D 50,000 units monthly

- folic acid 1 mg daily

- Lasix 20 mg twice a day

- NovoLog sliding scale before meals and nightly

Keppra 1000 mg twice a day

- sublingual nitroglycerin 0.4 mg sublingually every 5 minutes as needed

- omeprazole 40 mg daily

- oxybutynin 5 mg daily

- Lyrica 50 mg twice a day

- Advair Diskus 250/50 inhaler one inhalation twice a day

- Toujeo SoloStar 18 units twice a day

- trazodone 100 mg nightly

 

DISCHARGE INSTRUCTIONS:  Discharged home.  Followup with Lakshmi Briggs NP in 
a

week.  Activity as tolerated.  Consistent carbohydrate diet.  1800 mL fluid

restriction.  She is encouraged to return the emergency department if symptoms

should worsen or progress.  She voices understanding.

 

Discharge took 35 minutes.

EMELIA

## 2017-02-28 NOTE — REP
REASON:  Dyspnea and cough.

 

COMPARISON: Multiple, the latest 01/28/2017, a portable exam.

 

The technique utilized in obtaining the radiograph has magnified the cardiac

silhouette and accentuated the interstitial markings.

 

The cardiomediastinal silhouette is unchanged.  There is mild cardiomegaly. Note

is again made of a previous median sternotomy.  No acute patchy parenchymal

opacities or pleural effusions have developed.  There is no significant change of

the appearance of the lung fields. There is no change in the osseous structures.

 

 

IMPRESSION:

 

Stable  appearing chronic changes without evidence of acute cardiopulmonary

disease.

 

 

Signed by

Horacio Bahena DO 02/28/2017 03:49 P

## 2017-02-28 NOTE — HPE
DATE OF ADMISSION:    2017

 

PRIMARY CARE PROVIDER:  LACEY Brownlee of the [facility]

 

NEPHROLOGIST:  Dr. Griggs

 

PULMONOLOGIST:  Dr. Cummings

 

CARDIOLOGIST:  Dr. Cantor

 

HEMATOLOGIST:  Dr. Henderson

 

CHIEF COMPLAINT:   "They made me come in."

 

SUMMARY OF PRESENTATION:  This is a 66-year-old who was feeling well until

yesterday.  Felt a little short of breath on the day prior to presentation and

threw out her cigarettes, she decided to quit smoking.  She went to bed and she

woke up three to four times overnight, which is the normal amount to go to the

bathroom.  She developed cough productive of dark sputum and experienced sharp

pain that went to the back and to the arms. She does experience this type of

chest pain and pain in her arms on nearly a daily basis.  She says that in order

to make it go away she just rests, although is not particularly brought on by 
any

specific activity.  She has chills and night sweats.  Sitting up does make it

easier to breathe, but also does ease her chronic back pain.  Her diet at home 
is

less than 2 liters of fluid a day, low sodium.  She has lost 40 pounds in four

months.  She is not complaining of any swelling in her legs.  She has been

feeling quite sleepy.

 

PAST MEDICAL HISTORY:

Notable for:

1.  Coronary artery disease with coronary artery bypass graft (CABG) in .

2.  Chronic obstructive pulmonary disease (COPD).

3.  Diabetes on insulin.

4.  Dyslipidemia.

5.  Chronic kidney disease stage III.

6.  Hypertension.

7.  Obstructive sleep apnea, for which she uses bilevel positive airway pressure

(BIPAP).

8.  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

9. CHF with diastolic dysfunction

 

PAST SURGICAL HISTORY:

Notable for:

1.   section times three.

2.  Appendectomy.

3.  Tonsillectomy.

4.  Three-vessel CABG.

5.  Hysterectomy done with her last  section.

6.  Some sort of back fusion surgery.

7.  Bilateral carpal tunnel surgery.

8.  Left ankle surgery, which has left her with an uneven gait and has limited

her ability to ambulate.

9.  Left hip surgery.

10.  Right shoulder surgery.

11.  Bilateral cataract extraction and laser treatment thereafter.

 

FAMILY HISTORY:  Unremarkable due to comorbid illness.

 

SOCIAL HISTORY:   The patient continues to smoke on and off, but quit yesterday.

Does not use any alcohol.  She lives in [place] in the [facility]

Apartments.  She mainly uses a wheelchair.  Cannot walk any distance.

 

REVIEW OF SYSTEMS:

Notable for no headache.  No visual changes. No neck pain.  No sick contacts.

She has cough productive of sputum.  No current chest pain, although she did 
have

chest pain this morning. No abdominal pain.  She did feel like she was going to

have a bowel movement last night but was unable to.  She has noticed no

difference in her bowel or bladder patterns.  No focal weakness. Otherwise,

unremarkable.

 

ALLERGIES:  She has listed allergies to AMILORIDE, NEURONTIN, GUAIFENESIN,

SHELLFISH, SPIRONOLACTONE, STREPTOKINASE, VANCOMYCIN.

 

HOME MEDICATIONS:

Listed as:

- albuterol

- Norvasc 10 mg two tablets by mouth at night

- aspirin 325 mg daily

- atorvastatin 40 mg by mouth at night

- Coreg 25 mg by mouth twice a day

- vitamin D supplement monthly

- folic acid 1 mg daily

- NovoLog before meals

- Keppra 1000 mg by mouth twice a day

- sublingual nitroglycerin as needed

- omeprazole 40 mg daily

- oxybutynin 5 mg by mouth daily

- potassium chloride 10 mEq twice a day

- Lyrica 50 mg by mouth twice a day

- Advair 250/50 inhaled twice a day

- Topamax 50 mg by mouth twice a day

- torsemide 100 mg by mouth daily

- trazodone 100 mg by mouth at night

- ammonium lactate topically

- Toujeo

 

ASSESSMENT: This is a 66-year-old who presented with increasing shortness of

breath with possible chronic obstructive pulmonary disease (COPD) exacerbation 
or

perhaps concomitant or more likely decompensated heart failure.  The patient 
will

require at least a two midnight hospital stay in telemetry monitoring.

 

PLAN:

1.  The patient has shortness of breath and sputum production and history of

recent positive methicillin resistant Staphylococcus aureus (MRSA) screen.  The

patient is started on steroids and Teflaro empirically for bronchitis and/or 
COPD

exacerbation.  We will continue with aggressive pulmonary toilet.  We will give

an Acapella device.  The patient can use her home BiPAP, which is at bedside, 
for

her obstructive sleep apnea.  We will also treat for decompensated congestive

heart failure (CHF) with Lasix, which I believe will be useful in her recovery.

The patient will be fluid restricted.

 

2.  The patient has dehydration on chronic kidney disease and is being followed 
by

nephrology.  Continue with diuresis.  May benefit from nephrology consultation 
as

clinically warranted.

 

3.  The patient has known coronary artery disease.  We will continue her home

medications and aspirin. Patient has chronic diastolic dysfunction.

 

4.  The patient has type 2 diabetes and will be placed on a sliding scale.  May

require basal coverage, depending on how fast steroids can be weaned.  We do not

have Toujeo in the hospital, would need to use Levemir.

 

5.  The patient has a history of seizure.  Continue on home antiepileptic drugs.

 

 

6.  The patient has appropriate deep vein thrombosis (DVT) prophylaxis.

MTDD

## 2017-03-01 NOTE — ECGEPIP
Stationary ECG Study

                           [facility] - ED

                                       

                                       Test Date:    2017

Pat Name:     MORRIS ESPITIA            Department:   

Patient ID:   [id number]                 Room:         -

Gender:       F                        Technician:   sandy

:          1950               Requested By: LORY BHANDARI

Order Number: [id number]     Reading MD:   Jim Viera

                                 Measurements

Intervals                              Axis          

Rate:         73                       P:            43

TN:           159                      QRS:          12

QRSD:         120                      T:            129

QT:           394                                    

QTc:          436                                    

                           Interpretive Statements

SINUS RHYTHM

INFERIOR MYOCARDIAL INFARCTION, PROBABLY OLD

ANTEROLATERAL MYOCARDIAL INFARCTION, OF INDETERMINATE AGE

SIMILAR TO 18

Electronically Signed On 3-1-2017 10:38:40 EST by Jim Viera

## 2017-03-01 NOTE — IPNPDOC
Text Note


Date of Service


The patient was seen on 3/1/17.





NOTE


Subjective:


Patient is a 66 year old  female with a PMHx CAD s/p CABG, COPD, IDDM2

, DLP, CKD3, HTN, ALONZO on BIPAP, Cirrhosis (2/2 DM2) who presented to the ER 

because of productive cough, chest pain and dyspnea. Patient was admitted for 

COPD exacerbation.





Patient was seen and examined at the bedside. She was very upset with her 

experience at the hospital. Clinically she is doing well without any complaints.





Objective:


Vitals (See below)


General: Lying in bed, no acute distress, comfortable, AAOx3


HEENT: NC, AT


CVS: RRR, +S1S2


Lungs: Fair air entry b/l, -w/r/r


Abdomen: Soft, ND, NT, +BSx4


Extremities: +PPx4, - Edema, - Calf tenderness





Assessment and plan:


1. Dyspnea - likely 2/2 acute COPD exacerbation, less likely 2/2 decompensated 

diastolic CHF exacerbation 


- Presented with productive cough, chest pain and SOB


- Presented with wheezing - which revealed improvement today


- CXR no acute cardiopulmonary process


- c/w BIPAP from Home and Acapella 


- c/w Ceftaroline, Solumedrol, Duoneb





2. Normocytic anemia


- HG at baseline





3. CKD3


- Cr at baseline


- Will follow for now





4. CAD 


- c/w ASA and Atorvastatin





5. IDDM2


- c/w ISS and Levemir





6. Seizure disorder


- c/w topiramate





7. DLP


- c/w atorvastatin





8. HTN


- BP well controlled


- c/w home medications





9. ALONZO on BIPAP





10. Cirrhosis (2/2 DM2) 





11. GERD


- c/w omeprazole





12. DVT prophylaxis


- c/w Heparin





VS,Fishbone, I+O


VS, Fishbone, I+O


 Laboratory Tests


2/28/17 14:55








Calcium Level 8.5 L, Total Creatine Kinase 63





3/1/17 05:30








Calcium Level 8.6 L, Total Creatine Kinase 38, Red Blood Count 3.96 L, Mean 

Corpuscular Volume 84.9, Mean Corpuscular Hemoglobin 25.8 L, Mean Corpuscular 

Hemoglobin Concent 30.5 L, Red Cell Distribution Width 15.5 H








 Vital Signs








  Date Time  Temp Pulse Resp B/P Pulse Ox O2 Delivery O2 Flow Rate FiO2


 


3/1/17 12:00 96.7 75 22 142/70 97 Room Air  


 


3/1/17 07:53       4.0 














 I&O- Last 24 Hours up to 6 AM 


 


 3/1/17





 06:00


 


Output Total 300 ml


 


Balance -300 ml














DEVAN DINERO MD Mar 1, 2017 14:52

## 2017-03-02 NOTE — DSES
DATE OF ADMISSION:  02/28/2017

DATE OF DISCHARGE:  03/02/2017

 

ATTENDING PHYSICIAN:  Liz Luna MD

 

PRIMARY CARE PHYSICIAN:  Lakshmi Brgigs PA-C

 

REFERRING PHYSICIAN:  None.

 

CONSULTING PHYSICIANS:  None.

 

CONDITION ON DISCHARGE:  Stable.

 

FINAL DIAGNOSIS:  Community acquired pneumonia and chronic obstructive pulmonary

disease (COPD) exacerbation.

 

PROCEDURES:  None.

 

HISTORY OF PRESENT ILLNESS:  Patient is a 66-year-old  female with a

past medical history of coronary artery disease status post coronary artery

bypass grafting (CABG), chronic obstructive pulmonary disease (COPD), insulin

dependent diabetes mellitus type 2, dyslipidemia, chronic kidney disease III,

hypertension, obstructive sleep apnea (ALONZO) on BiPAP, cirrhosis secondary to

diabetes mellitus type 2, who presented to the emergency room because of

productive, chest pain and dyspnea.  The patient was admitted for COPD

exacerbation and possible community acquired pneumonia.  The patient was seen and

examined at the bedside.

 

HOSPITAL COURSE:

1.  Acute COPD exacerbation less likely decompensated diastolic CHF.  Presented

with productive cough, chest pain and shortness of breath.  Presented with

wheezing, which has improved.  Chest x-ray revealed no acute cardiopulmonary

process. Continued with BiPAP from home and Acapella.  Continue with ceftaroline,

Solu-Medrol and DuoNeb upon discharge.  She is being discharged home with

doxycycline and Augmentin as well as a tapering dose of prednisone.

 

2.  Normocytic anemia.  Hemoglobin is at baseline.

 

3.  Chronic kidney disease III.  Currently at baseline.

 

4.  Coronary artery disease.  Continue with aspirin and atorvastatin.

 

5.  Insulin dependent diabetes mellitus type 2.  Continue with insulin sliding

scale and Levemir.

 

6.  Seizure disorder.  Continue with topiramate.

 

7.  Dyslipidemia.  Continue with atorvastatin.

 

8.  Hypertension.  Blood pressure well controlled.  Continue with home

medications.

 

9.  Obstructive sleep apnea (ALONZO).  Continue with BiPAP.

 

10.  Cirrhosis secondary to diabetes mellitus type 2.

 

11.  Gastroesophageal reflux disease (GERD).  Continue with omeprazole.

 

12.  Deep vein thrombosis (DVT) prophylaxis.  Continue with heparin.

 

DISCHARGE MEDICATIONS:  The patient is being discharged home with the following

medication list:

- albuterol inhaled two puffs inhaled every 4 hours as needed shortness of breath

 

- amlodipine 20 mg by mouth at bedtime

- aspirin 325 mg by mouth daily

- atorvastatin 40 mg by mouth at bedtime

- carvedilol 25 mg by mouth twice a day

- vitamin D 50,000 units by mouth monthly

- folic acid 1 mg by mouth daily

- insulin aspart to be taken as directed

- Keppra 1000 mg by mouth twice a day

- nitroglycerin 0.4 mg sublingual as needed angina

- omeprazole 40 mg by mouth daily

- oxybutynin 5 mg by mouth daily

- potassium chloride 10 mEq by mouth twice a day

- Lyrica 50 mg by mouth twice a day

- Advair Diskus 250/50 one puff inhaled twice a day

- topiramate 50 mg by mouth twice a day

- torsemide 100 mg by mouth daily

- Toujeo 18 units subcutaneous twice a day

- trazodone 100 mg by mouth at bedtime

 

New medications prescribed include:

- Augmentin 875/125 by mouth twice a day  for 10 tablets

- doxycycline 100 mg by mouth twice a day for 10 tablets

- prednisone to be taken as directed on tapering basis

 

DISCHARGE INSTRUCTIONS:  The patient will be discharged with instructions to

followup with her primary care provider and pulmonologist within the next seven

days.  She has been advised to remain compliant with her treatment plan and

medications and return to the emergency room if she experiences any problems.

 

Time spent on discharge is 35 minutes.

## 2017-03-27 NOTE — REP
Clinical: Bilateral lower extremity pain and swelling.

 

Technique:  Gray scale and color Doppler evaluation using linear high frequency

transducer.

 

Findings:

Ultrasound examination of the right and left lower extremity deep venous

structures from the common femoral vein to the popliteal vein demonstrates normal

compressibility flow and wave patterns in response to respiration and

augmentation.  There is no evidence for deep venous thrombosis. Right popliteal

fossa demonstrates 3.4 x 0.6 x 2.2 cm Baker's cyst.

 

Impression:

No evidence for deep venous thrombosis.

Right Pierre's cyst.

 

 

Signed by

Yuan Spring MD 03/27/2017 02:20 P

## 2017-04-04 NOTE — REP
Portable chest, single AP view, patient sitting:

 

Comparison is 02/28/2017.

 

There is focal slightly increased radiodensity inferiorly in the right lung as an

interval change.  This could be artifact from portable positioning and

superimposed right breast or could represent a right lower lobe infiltrate.

 

There are no effusions.

 

Sternotomy wires and cardiomegaly are again noted, unchanged.  The linda,

mediastinum, and bony thorax are normal.

 

 

Signed by

Lewis Davis MD 04/04/2017 07:56 A

## 2017-04-04 NOTE — HPE
DATE OF ADMISSION:  2017

 

PRIMARY CARE PROVIDER:  Cheyenne Arellano in [place], [place]

 

PULMONOLOGIST:  Dr. Cummings

 

NEPHROLOGIST:  Dr. Griggs

 

CHIEF COMPLAINT:   Worsening shortness of breath.

 

HISTORY OF PRESENT ILLNESS:  This patient is a 66-year-old female with

significant history for coronary artery disease, status post coronary artery

bypass graft (CABG), chronic obstructive pulmonary disease (COPD), type 2

diabetes, dyslipidemia, chronic kidney disease stage III, hypertension,

obstructive sleep apnea on bilevel positive airway pressure (BIPAP), congestive

heart failure (CHF), who presented to [facility] on 2017 for

worsening shortness of breath.  The patient stated that for the past few days she

has experienced worsening of breathing and it has become very severe in the last

24 hours, therefore, the patient had to come to the hospital for further

treatment.  The patient has a history of frequent hospital visits.  The patient's

last admissions were in 2016, 2017 and 2017.  Mostly, the

patient came here for COPD exacerbation or CHF exacerbations.  Per the patient,

the patient denies any worsening cough.  Denies any sputum production; however,

the patient cannot lie flat at all because of the shortness of breath.

 

ALLERGIES:   GABAPENTIN, AMILORIDE, SPIRONOLACTONE, VANCOMYCIN, STREPTOKINASE.

 

PAST MEDICAL HISTORY:

1.  Coronary artery disease, status post coronary artery bypass graft (CABG).

2.  Myocardial infarction in .

3.  COPD exacerbation.

4.  Type 2 diabetes.

5.  Dyslipidemia.

6.  Chronic kidney disease stage III.

7.  Hypertension.

8.  Obstructive sleep apnea on BiPAP.

9.  Diastolic congestive heart failure.

10.  Cirrhosis.

 

PAST SURGICAL HISTORY:

1.   section times three.

2.  Appendectomy.

3.  Tonsillectomy.

4.  Three vessel CABG

5.  Hysterectomy.

6.  Back fusion surgery.

7.  Bilateral carpal tunnel surgery.

8.  Left ankle surgery.

9.  Left hip surgery.

10.  Right shoulder surgery.

11.  Bilateral cataract extractions.

 

SOCIAL HISTORY:  Patient has been smoking one pack daily since 17 years old.  The

patient quit intermittently.  She states that her last cigarette was 2 weeks ago.

Denies alcohol use.  Denies any recreational drug use.  The patient is FULL CODE.

The patient is wheelchair bound.

 

REVIEW OF SYSTEMS:

GENERAL:  No fever.  No chills.

HEENT:  No vision changes, no auditory changes.

CARDIOVASCULAR:  No chest pain.  No palpitations.  The patient does have a

history of coronary artery disease, status post CABG  The patient also has

diastolic congestive heart failure.

RESPIRATORY:  Frequent COPD exacerbations.  Currently, the patient has increased

shortness of breath.  Denies any increased cough.  Denies any increased sputum

production.  Denies any significant wheezes.

GASTROINTESTINAL:  Diarrhea for the past month.  No abdominal pain.  No nausea,

no vomiting.

MUSCULOSKELETAL:  Multiple surgeries in the past that resulted in decreased motor

function.  Chronic joint pain.

NEUROLOGICAL:  Decreased sensation in the left lower extremity that results from

the previous surgery.

 

OBJECTIVE:

VITAL SIGNS:  Temperature is 96.2, pulse is 69, blood pressure is 153/70, pulse

oximetry is 94% on room air.

GENERAL:  Mild to moderate distress secondary to difficulty breathing.  The

patient is alert and oriented times three.

HEENT:  Positive jugular venous distention (JVD). Normocephalic, atraumatic.

Extraocular motors are grossly intact.

CARDIOVASCULAR:  Positive S1, S2.  Regular rate.  Distant heart sounds.

LUNGS:  Poor respiratory effort.  I cannot appreciate any significant wheezes;

however, there are some mild crackles.

ABDOMEN:  Soft, nontender, nondistended.  Bowel sounds are present.  No rebound

or guarding.

MUSCULOSKELETAL:  Positive bilateral pitting edema.  No sign of cyanosis.

NEUROLOGIC:   Sensation to fine touch is decreased of the left distal lower

extremity.  Muscle strength is difficult to be assessed due to right shoulder

pain and lower extremity joint pain, but in general, the patient's muscle

strength is approximately 4/5 throughout.

 

LABORATORY DATA:  WBC 4.7, hemoglobin 9.6, hematocrit 32.4, platelet count is 97.

 

 

Sodium is 143, potassium 4.5, chloride is 109, carbon dioxide 29, BUN 27,

creatinine 2.03, GFR is 26.2, fasting glucose 143, calcium is 8, BNP is 2220.

 

ABG showed pH of 7.3, PCO2 is 53.2, PO2 is 190, HDL3 is 25.6.

 

Microbiology:  Blood cultures are pending times two.

 

IMAGING STUDIES:  Chest x-ray:  Official report pending; however, I cannot

appreciate any consolidation.  There could be vascular congestion.

 

ASSESSMENT AND PLAN:

1.  Acute on chronic respiratory failure.  The patient will be admitted to the

progressive care unit (PCU) under observation status.  The patient has a history

of frequent COPD and CHF exacerbations.  Physical examination and the patient's

history, it seems that the patient currently has a CHF exacerbation.  We will

give the patient IV Lasix.  The patient will be on 2 liters of fluid restriction.

We will try to achieve negative output.  We will monitor daily weights.  The

patient still requires an elevated amount of oxygen support.  When the patient

first arrived in the emergency room, the patient was on aerosol mask and now the

patient's oxygen saturation is being maintained with nasal cannula.  We will try

to maintain oxygen saturations between 88 and 92%.  The patient's ABG showed that

there is CO2 retention.  When the patient is at rest, the patient has to use her

BiPAP and the patient is on obstructive sleep apnea protocol.

 

2.  Diastolic congestive heart failure (CHF).  The patient is on IV Lasix.  At

baseline, the patient is using torsemide.

 

3.  COPD.  Currently, I do not feel that the patient has an exacerbation.  The

patient already received one dose of dexamethasone in the emergency room.

 

4.  Obstructive sleep apnea, on BiPAP.  ALONZO protocol.

 

5.  Type 2 diabetes.  Continue Levemir 80 units twice a day.  The patient will be

on sliding scale.  The patient is on consistent carbohydrate diet.  We will check

A1/c.

 

6.  Dyslipidemia.  Continue home medications.

 

7.  Questionable seizure history.  Continue home medications.

 

8.  Acute on chronic kidney failure.  The patient has baseline stage III.

Currently, the patient has prerenal azotemia, possibly due to fluid overload.

The patient is on Lasix.  We will follow renal functions.

 

9.  Hypertension.  Continue home medications.

 

10.  Gastroesophageal reflux disease (GERD).  Continue Protonix.

 

11.  Deep vein thrombosis (DVT) prophylaxis.  THe patient is on heparin.

## 2017-04-04 NOTE — REP
CT of the chest without IV contrast to evaluate for infiltrate.

 

Comparison is the portable plain film study performed earlier today. There is

also a comparison CT dated 12/31/2015.

 

 

 

There are no infiltrates.  However, there are moderate bilateral pleural

effusions.

 

There is ascites in the visualized upper abdomen.  The margin of the liver is

nodular compatible with cirrhosis.  The spleen appears enlarged.

 

There is minor atelectasis in the lower lung lobes adjacent to the effusions.  A

small volume of effusion has insinuated into the minor fissure on the right.

 

A small volume of opaque ingested material is noted in the distal esophagus and

the gastric fundus.

 

There is an enlarged paratracheal node measuring 9 mm in diameter.  This is

unchanged.  The azygos node is upper normal size, unchanged.  There is no other

mediastinal lymph node enlargement.  In the absence of IV contrast the study is

insensitive for hilar lymph node enlargement.  There is no axillary lymph node

enlargement.

 

The thoracic aorta is unremarkable.  Cardiac size is upper normal.  There is no

pericardial effusion.

 

Impression:

 

Moderate bilateral pleural effusions as described.  Atelectasis in the lower

lobes adjacent to the effusions.  A small volume of effusion has insinuated into

the minor fissure.

 

Abdominal ascites, cirrhosis, splenomegaly.

 

Enlarged paratracheal node, unchanged.  Borderline enlarged azygos node,

unchanged.  Cardiac size is borderline.

 

 

 

 

Signed by

Lewis Davis MD 04/04/2017 10:10 A

## 2017-04-05 NOTE — ECHO
DATE OF PROCEDURE:  04/05/2017

 

REFERRING PHYSICIAN: Roly Kaur MD

 

PATIENT LOCATION: Room 3208

 

REASON FOR ECHOCARDIOGRAM: Heart failure, unspecified.

 

2D MEASUREMENTS:

IVS: 1.3 cm

LV: 5.5 cm

LVPW: 1.2 cm

LA: 4.9 cm

Aorta: 3.5 cm

IVC: 1.6 cm

 

DOPPLER MEASUREMENTS:

Peak velocity across the aortic valve: 1.3 m/s

Peak velocity across the LVOT: 1.2 m/s

Mitral E: 1.1, Mitral A: 0.96, with a ratio of 1.2

Maximum tricuspid valve velocity: 3.2 m/s

 

2D COMMENTS:

1. Normal left ventricular size with mildly increased left ventricular wall

thickness. Left ventricular systolic function is normal estimated at 60 to 65%.

2. Mildly enlarged left atrium. Normal right atrium and right ventricle.

3. The atrial septum appeared to be normal without evidence of defect or shunt.

4. Normal aortic root.

5. No pericardial effusion seen, but bilateral pleural effusion was noted.

6. Mildly calcified aortic valve with normal leaflet excursion. Mildly calcified

mitral annulus with normal anterior mitral valve leaflet motion. Normal tricuspid

valve. The pulmonic valve and proximal pulmonary artery branches were not well

visualized.

7. The inferior vena cava was normal in size, central venous pressure is most

likely normal.

 

DOPPLER:

It detects moderately severe mitral regurgitation and mild tricuspid

regurgitation. The calculated pulmonary artery systolic pressure varies between

40 to 50 mmHg. Assessment of the left ventricular diastolic function appeared to

be normal. Trace to mild pulmonic regurgitation also detected.

 

IMPRESSION:

1. Normal global left ventricular systolic function with mild concentric left

ventricular hypertrophy. Left ventricular systolic function appeared to be

normal.

2. Aortic valve sclerosis without stenosis or significant aortic regurgitation.

3. Mitral annulus calcification with mildly enlarged left atrium and moderately

severe mitral regurgitation.

4. Mild tricuspid regurgitation with probably moderate pulmonary hypertension.

5. Bilateral pleural effusion was noted. No pericardial effusion seen.

## 2017-04-05 NOTE — ECGEPIP
Stationary ECG Study

                           [facility] - ED

                                       

                                       Test Date:    2017

Pat Name:     MORRIS ESPITIA            Department:   

Patient ID:   [id number]                 Room:         -

Gender:       F                        Technician:   clarisse

:          1950               Requested By: ANDREW LANDRY 

Order Number: [id number]     Reading MD:   Basia Galindo

                                 Measurements

Intervals                              Axis          

Rate:         68                       P:            33

ME:           162                      QRS:          7

QRSD:         117                      T:            67

QT:           422                                    

QTc:          450                                    

                           Interpretive Statements

SINUS RHYTHM

INFERIOR MYOCARDIAL INFARCTION, PROBABLY OLD

ANTEROLATERAL MYOCARDIAL INFARCTION, OF INDETERMINATE AGE

SIMILAR 18

Electronically Signed On 2017 21:26:53 EDT by Basia Galindo

## 2017-04-05 NOTE — IPNPDOC
Text Note


Date of Service


The patient was seen on 4/5/17.





NOTE


Subjective: Pt states she feels much better. Denies shortness of breath/chest 

pain/palpitations.





Objective: 


Vitals: (see below) 


General: No acute distress, laying comfortably in bed.


HEENT: Moist mucous membranes.


Neck: No JVD or lymphadenopathy


Cardiac: RRR, No murmurs


Pulm: Coarse crackles bilateral bases b/l. No wheezing, rhonchi


Abd: NT/ND + BS


Ext: Trace edema bilateral lower extremities. No cyanosis. 





Labs (see below) 





Images: 


CT Chest 4/4/17 Impression: Moderate bilateral pleural effusions as described.  

Atelectasis in the lower


lobes adjacent to the effusions.  A small volume of effusion has insinuated into


the minor fissure.


 Abdominal ascites, cirrhosis, splenomegaly.


 Enlarged paratracheal node, unchanged.  Borderline enlarged azygos node,


unchanged.  Cardiac size is borderline.





Assessment/Plan 


1. Acute decompensated diastolic heart failure- diuresed well. Now off of 

oxygen. Will change Lasix to torsemide.  Monitor daily weights. Fluid 

resection. Patient does have obstructive sleep apnea continues to use her CPAP 

at night. Echocardiogram pending.


2. COPD-stable. Continue nebs


3. Diabetes mellitus- Levemir and sliding scale insulin


4. Hyperlipidemia- continue meds


5. History of seizures- continue home meds


6. Chronic kidney disease stage 3-4.  Appears to be patient's baseline. Avoid 

nephrotoxins.


7. Hypertension- continue home meds


8. GERD- continue PPI





DVT prophy: Heparin subcutaneous





Plan to discharge in the next 24-48 hours.





VS,Fishbone, I+O


VS, Fishbone, I+O


 Laboratory Tests


4/5/17 04:48








Calcium Level 7.8 L, Red Blood Count 3.54 L, Mean Corpuscular Volume 83.7, Mean 

Corpuscular Hemoglobin 25.8 L, Mean Corpuscular Hemoglobin Concent 30.8 L, Red 

Cell Distribution Width 16.9 H








 Vital Signs








  Date Time  Temp Pulse Resp B/P Pulse Ox O2 Delivery O2 Flow Rate FiO2


 


4/5/17 12:00 98.7 66 22 139/67 92 Room Air  


 


4/5/17 04:00       3.0 














 I&O- Last 24 Hours up to 6 AM 


 


 4/5/17





 06:00


 


Intake Total 905 ml


 


Output Total 2800 ml


 


Balance -1895 ml














JASPREET JAMES MD Apr 5, 2017 14:56

## 2017-04-06 NOTE — DS.PDOC
Discharge Summary


General


Date of Admission


Apr 4, 2017 at 06:17


Date of Discharge


Apr 6, 2017 at 13:01


Primary Care Physician:  Lakshmi Briggs PA-C, LAC


Attending Physician:  JASPREET JAMES MD





Discharge Summary


PROCEDURES PERFORMED DURING STAY: None.





ADMITTING/DISCHARGE DIAGNOSES:


1. Acute decompensated diastolic heart failure


2. Severe mitral regurg/moderate pulmonary hypertension


3. Obstructive sleep apnea


4. COPD


5. Diabetes mellitus


6. History of seizures


7. CKD stage III- 4


8. Hypertension


9. GERD 


10. Cirrhosis





COMPLICATIONS/CHIEF COMPLAINT: Acute Respiratory Failure With Hypoxia.





HISTORY OF PRESENT ILLNESS/HOSPITAL COURSE: . 


This is a CT 6-year-old female past medical history of cirrhosis, diastolic 

heart failure, obstructive sleep apnea, diabetes presents complaining of 

dyspnea on exertion. Patient was found to be volume overloaded and was 

initially started on around-the-clock Lasix IV. Patient was transitioned to her 

torsemide by mouth. Patient was diuresed well and tolerated therapy well. 

Patient is wheelchair/walker bound and did have physical therapy evaluate her 

with good results for home safety. 





Abdomen patient did have an episode of hypoglycemia, however did receive juice 

and her blood sugars now back to her baseline. Patient was educated on checking 

her blood sugars at home however she does state that she almost never has 

hypoglycemic episodes.





The patient will be following up with Dr. Cantor and her primary care physician 

on discharge.





DISCHARGE MEDICATIONS: Please see below.


 


ALLERGIES: Please see below.





PHYSICAL EXAMINATION ON DISCHARGE:


VITAL SIGNS: Please see below.


General: No acute distress, laying comfortably in bed.


HEENT: Moist mucous membranes.


Neck: No JVD or lymphadenopathy


Cardiac: RRR, No murmurs


Pulm: Coarse crackles bilateral bases b/l. No wheezing, rhonchi


Abd: NT/ND + BS


Ext: Trace edema bilateral lower extremities. No cyanosis. 





LABORATORY DATA: Please see below.





IMAGING: 


CT Chest 4/4/17 Impression: Moderate bilateral pleural effusions as described.  

Atelectasis in the lower


lobes adjacent to the effusions.  A small volume of effusion has insinuated into


the minor fissure.


 Abdominal ascites, cirrhosis, splenomegaly.


 Enlarged paratracheal node, unchanged.  Borderline enlarged azygos node,


unchanged.  Cardiac size is borderline.





PROGNOSIS: Fair





ACTIVITY: As tolerated.





DIET: Low-sodium





DISCHARGE PLAN: Home with home services





DISPOSITION: [facility].





DISCHARGE INSTRUCTIONS:


1. Follow-up with PCP and Dr. Barnard in 1-2 weeks





DISCHARGE CONDITION: Stable.





TIME SPENT ON DISCHARGE: Greater than 30 minutes.





Vital Signs/I&Os





 Vital Signs








  Date Time  Temp Pulse Resp B/P Pulse Ox O2 Delivery O2 Flow Rate FiO2


 


4/6/17 12:00 97.8 64 18 130/63 90 Room Air  


 


4/6/17 08:00       2.0 














 I&O- Last 24 Hours up to 6 AM 


 


 4/6/17





 06:00


 


Intake Total 1265 ml


 


Output Total 2301 ml


 


Balance -1036 ml











Laboratory Data


Labs 24H


 Laboratory Tests 2


4/5/17 17:16: Bedside Glucose (Misc Panel) 228H


4/5/17 21:04: Bedside Glucose (Misc Panel) 181H


4/6/17 04:57: 


Anion Gap 5L, Blood Urea Nitrogen 36H, Creatinine 2.12H, Sodium Level 146H, 

Potassium Level 3.4L, Chloride Level 109H, Carbon Dioxide Level 32, Calcium 

Level 7.7L, Glomerular Filtration Rate 24.9L


4/6/17 08:38: Bedside Glucose (Misc Panel) 38*L


4/6/17 09:04: Bedside Glucose (Misc Panel) 47L


CBC/BMP


 Laboratory Tests


4/6/17 04:57








Calcium Level 7.7 L, Red Blood Count 3.59 L, Mean Corpuscular Volume 83.3, Mean 

Corpuscular Hemoglobin 25.8 L, Mean Corpuscular Hemoglobin Concent 30.9 L, Red 

Cell Distribution Width 17.1 H


FSBS





Laboratory Tests








Test


  4/5/17


17:16 4/5/17


21:04 4/6/17


08:38 4/6/17


09:04 Range/Units


 


 


Bedside Glucose (Misc Panel) 228 181 38 47   MG/DL











Microbiology





 Microbiology


4/5/17 Blood Culture - Preliminary, Resulted


         No growth after 24 hours . All specim...


4/5/17 Blood Culture, Received


         Pending


4/4/17 Blood Culture - Preliminary, Resulted


         No Growth after 48 hours. All Specime...


4/4/17 Blood Culture - Preliminary, Resulted


         No Growth after 48 hours. All Specime...





Discharge Medications


Scheduled


(Toujeo Solostar) 300 Unit/Ml Inj 18 UNIT SC BID  (Reported) 


(Calcium 500+D 500-200 mg-Unit) 1 Tab Tab 1 TAB PO BID  (Reported) 


Amlodipine Besylate (Amlodipine Besylate) 10 Mg Tab 10 MG PO DAILY  


Aspirin (Aspirin EC) 325 Mg Tabec 325 MG PO DAILY  (Reported) 


Atorvastatin Calcium (Atorvastatin Calcium) 40 Mg Tab 40 MG PO QHS  (Reported) 


Carvedilol (Carvedilol) 25 Mg Tab 25 MG PO BID  (Reported) 


Ergocalciferol (Vitamin D) 50,000 Unit Cap 50,000 UNIT PO MTHLY  (Reported) 


   takes in the beginning of each month 


Folic Acid (Folic Acid) 1 Mg Tab 1 MG PO DAILY  (Reported) 


Insulin Aspart (Novolog) 100 U/Ml Inj 1 DOSE SC AC  (Reported) 


   PER HOME SLIDING SCALE 


Lactobacillus Acidophilus (Bacid) 1 Tab Tab 1 TAB PO BID  (Reported) 


Levetiracetam (Keppra) 1,000 Mg Tab 1,000 MG PO BID  (Reported) 


Omeprazole (Omeprazole) 40 Mg Cap 40 MG PO DAILY  (Reported) 


Potassium Chloride (Klor-Con M10) 10 Meq Tabcr 20 MEQ PO BID  (Reported) 


Pregabalin (Lyrica) 50 Mg Cap 50 MG PO BID  (Reported) 


Salmeterol/Fluticasone (Advair Diskus 250-50 Mcg/Dose) 14 Puff/Inhaler Aerp 1 

PUFF INH BID  (Reported) 


Topiramate (Topiramate) 50 Mg Tab 50 MG PO BID  (Reported) 


Torsemide (Torsemide) 100 Mg Tab 100 MG PO DAILY  (Reported) 


Trazodone HCl (Trazodone HCl) 100 Mg Tab 100 MG PO QHS  (Reported) 





Scheduled PRN


(Ammonium Lactate) 12 % Cre 1 DOSE TOP BID PRN PRN DRY FEET (Reported) 


Albuterol Sulfate (Ventolin Hfa) 200 Puff/8 Gm Aers 2 PUFF INH Q4H PRN PRN 

SHORTNESS OF BREATH (Reported) 


Albuterol Sulfate (Albuterol Sulfate) 2.5 Mg/3 Ml Nebu 2.5 MG INH QID PRN PRN 

SOB/WHEEZING (Reported) 


Nitroglycerin (Nitrostat) 0.4 Mg Subl 0.4 MG SL NITRO PRN PRN ANGINA (Reported) 





Allergies


Coded Allergies:  


     Streptokinase (Unverified  Allergy, Severe, BLOOD CLOTS, 3/30/16)


     Amiloride (Verified  Allergy, Intermediate, RASH/GENERAL ITCHING, 4/8/13)


 RASH/GENERAL ITCHING


     Guaifenesin & Derivatives (Unverified  Allergy, Intermediate, FACE SWELLING

, 3/30/16)


     Shellfish Allergy (Verified  Allergy, Intermediate, RASH, 4/8/13)


     Spironolactone (Unverified  Allergy, Intermediate, RASH, 4/8/13)


     Vancomycin (Unverified  Allergy, Intermediate, HIVES,SWELLING,RASH, 4/8/13)


     Gabapentin (Verified  Adverse Reaction, Severe, SUICIDAL IDEATIONS, 4/8/13)








JASPREET JAMES MD Apr 6, 2017 15:38

## 2017-04-14 NOTE — REP
RIGHT LOWER EXTREMITY DOPPLER VENOUS ULTRASOUND:  04/14/2017.

 

Clinical history:  Right lower extremity swelling.  Evaluate for DVT.  No Baker's

cyst.

 

Comparison: 03/27/2017, 04/18/2012.

 

Technique:  The deep venous system of the right lower extremity is evaluated with

gray scale imaging, compression ultrasound, color imaging and duplex Doppler

interrogation.  Examination from the groin through the popliteal fossa into the

proximal calf.

 

Findings: There is full compressibility from the common femoral vein in the

inguinal region through the popliteal vein.  Color imaging confirms patency

throughout the course of the deep venous system.  There is respiratory variation

and augmented flow at all levels. Incidentally noted is a popliteal fossa cyst

3.7 x 2.2 x 0.5 cm.

 

Impression:

1.  No Doppler venous ultrasound evidence of DVT in the right lower extremity.

2.  Popliteal fossa cyst, unchanged.

 

 

Signed by

Moises Sandoval MD 04/14/2017 04:25 P

## 2017-04-26 NOTE — REP
Acute abdominal series three views including PA chest and supine upright

abdomen:

 

PA chest:

 

Comparison is 04/04/2017.

 

Lung fields are clear.  Cardiac size is borderline enlarged.  There are

sternotomy wires, unchanged.  The linda, mediastinum, and bony thorax are

unremarkable.

 

There is no free subdiaphragmatic air.

 

Impression:

 

Borderline cardiac size.  Sternotomy wires.  Lung fields are clear.

 

Abdomen, supine upright views:

 

The bowel gas pattern is normal.  There is degenerative disc disease in the

lumbar spine.  There is a left hip arthroplasty partially excluded at the

inferior film margin.  No unusual calcifications.

 

Impression:

 

Normal bowel gas pattern.

 

 

Signed by

Lewis Davis MD 04/26/2017 09:13 A

## 2017-04-26 NOTE — REP
CT abdomen and pelvis without IV or bowel contrast:

 

Comparison is 01/25. 2017.

 

The visualized lung fields are unremarkable.

 

The hepatic margin has a nodular appearance, unchanged, compatible with

cirrhosis.

 

There is ascites surrounding the liver and spleen extending in the colic gutters

and in the pelvis, as previously.  The pancreas is unremarkable.  There is

splenomegaly as previously.

 

There is a small right adrenal adenoma, unchanged.  Left adrenal is

unremarkable.

 

There is vascular atheroma in the kidneys.  The kidneys are mildly atrophic,

unchanged.  Otherwise unremarkable.  The abdominal aorta is unremarkable except

for calcified atheroma.

 

The abdominal aorta is unremarkable except for calcified atheroma.

 

There is no bowel distension.

 

Pelvis:

 

There is a left hip arthroplasty with beam-hardening artifact obscuring

visualization of the pelvis.  Unable to visualize the uterus or adnexa.  The

bladder is grossly unremarkable.

 

There is any circumferential edema in the subcutaneous fat compatible with

anasarca.

 

Impression:

 

Abdominal ascites.  Nodular hepatic surface compatible with cirrhosis.

Splenomegaly.  Right adrenal adenoma, unchanged.  Edema in the subcutaneous fat

compatible with anasarca.  No bowel distension or obstruction.  Vascular

atheroma.

 

 

Signed by

Lewis Davis MD 04/26/2017 01:00 P

## 2017-04-26 NOTE — REP
ULTRASOUND-GUIDED PARACENTESIS:

 

The procedure was performed under the direct supervision of Dr. Duque.

 

The risks and benefits of the procedure were explained to the patient and

informed consent was obtained.

 

The largest pocket of fluid was localized in the right flank using ultrasound

guidance. The skin was prepped and draped in a sterile fashion. 1% Lidocaine was

used as a local anesthetic. Using ultrasound guidance, an #8-Maltese

multi-side-hole catheter was inserted using trocar technique. 3400 mL of

red-colored fluid was withdrawn and discarded.

 

The patient tolerated the procedure well and there were no immediate

complications.

 

 

 

 

 

 

Reviewed by

STEVEN Nice 04/27/2017 04:42 PEdited and Signed by

Lewis Duque MD 04/27/2017 04:59 P

## 2017-04-29 NOTE — REPUSA
CLINICAL HISTORY:  Low abdominal pain, rectal pain.

 

TECHNIQUE:  Multiple axial CT images were obtained through the abdomen and pelvis after administratio
n of oral contrast material only.

 

COMMENTS:

Comparison is made with the prior study dated 4/26/2017.  Overall, no significant interval change.

 

The liver is enlarged, markedly lobulated compatible with cirrhosis.  There is diffuse anasarca prese
nt.  There is no intra or extrahepatic biliary ductal dilatation.  The spleen is normal.  The gallbla
dder is not identified.  Correlate with surgical status.  The pancreas is of normal contour and atten
uation characteristics.  There is no evidence of adrenal mass. 

 

Both kidneys are markedly lobulated and atrophic.  No evidence of renal mass.  There is no hydrourete
r or hydronephrosis.  There are bilateral renal vascular calcifications noted.

 

There is no evidence for appendicitis.  There is evidence of severe circumference wall thickening inv
olving small bowel loops compatible with panenteritis.  This may be related to hypoalbuminemia rather
 than intrinsic small bowel pathology.  Clinical correlation is recommended.  No evidence for small o
r large bowel obstruction.  There is no evidence of abdominal lymphadenopathy.

 

There is no evidence of intrinsic or extrinsic bladder mass.  

 

The uterus and ovaries are not well seen.  Correlate with surgical status.

 

Images of the lung bases show no evidence of pleural or parenchymal mass.  There are no pleural effus
ions. 

 

The bony structures are free of lytic or blastic lesions.  Multilevel degenerative changes are seen i
nvolving the thoracolumbar spine.  Status post total left hip replacement.

 

Scattered calcifications are seen involving the aorta and major branches compatible with atherosclero
sis.

 

Small hiatal hernia is seen.

 

The heart is enlarged.

 

Large amount of abdominal and pelvis ascites is noted.

 

Victor catheter is in place.  Air is present in the urinary bladder.

 

Small fat containing umbilical hernia is present.

 

IMPRESSION:

1.  Evidence of cirrhosis and portal hypertension.

2.  Massive amount of abdominal and pelvis ascites.

3.  Small bowel wall thickening as discussed above.

4.  Additional findings as above.

 

Thank you for your kind referral of this patient. We appreciate the opportunity to participate in thi
s patient's care.

 

     Electronically signed by HUNTER TSE MD on 04/29/2017 10:43:36 PM ET

## 2017-04-30 NOTE — SMCUROLCON
Urology Consultation


General


Date of Consultation


4/30/17


Reason For Consultation


This 67yo female patient is seen for Cirrhosis Of Liver; AUR; ARF on CRF. Victor 

inserted 750cc clear urine. Plan: Victor to SD. f/u Urology 1 week after dc 

home. Paracentesis per Hospitalist. Full consultation dictated.





History of Present Illness


The patient is a -year-old  with a past medical history for .





Medications


Current Medications





Current Medications


Albuterol Sulfate (Proventil Neb) 2.5 mg QID  PRN INH SOB/WHEEZING;  Start 4/30/ 17 at 00:15;  Stop 5/30/17 at 00:14


Albuterol Sulfate (Proventil, Ventolin Hfa) 2 puff Q4H  PRN INH SHORTNESS OF 

BREATH;  Start 4/30/17 at 00:15;  Stop 5/30/17 at 00:14


Aspirin (Ecotrin) 325 mg DAILY PO  Last administered on 4/30/17at 09:23;  Start 

4/30/17 at 09:00;  Stop 5/30/17 at 08:59


Atorvastatin Calcium (Lipitor) 40 mg QHS PO  Last administered on 4/30/17at 01:

00;  Start 4/29/17 at 21:00;  Stop 5/29/17 at 20:59


Calcium/Vitamin D (Oscal D) 500 mg BID PO  Last administered on 4/30/17at 09:23

;  Start 4/29/17 at 21:00;  Stop 5/29/17 at 20:59


Carvedilol (COReg) 25 mg BID PO  Last administered on 4/30/17at 09:24;  Start 4/ 29/17 at 21:00;  Stop 5/29/17 at 20:59


Ceftriaxone Sodium 2 gm/ Dextrose 50 ml @  100 mls/hr Q24H IV  Last 

administered on 4/30/17at 02:55;  Start 4/30/17 at 03:00;  Stop 5/7/17 at 02:59


Folic Acid (Folic Acid) 1 mg DAILY PO  Last administered on 4/30/17at 09:23;  

Start 4/30/17 at 09:00;  Stop 5/30/17 at 08:59


Home Med (Med Rec Complete!)  ASDIRECTED XX ;  Start 4/29/17 at 21:45;  Stop 4/ 29/17 at 21:45;  Status DC


Insulin Detemir (Levemir Insulin) 18 units BID SC  Last administered on 4/30/ 17at 09:24;  Start 4/29/17 at 21:00;  Stop 5/29/17 at 20:59


Lactobacillus Acidophilus (Bacid) 1 ea BID PO  Last administered on 4/30/17at 09

:22;  Start 4/29/17 at 21:00;  Stop 5/29/17 at 20:59


Levetiracetam (Keppra) 1,000 mg BID PO  Last administered on 4/30/17at 09:24;  

Start 4/29/17 at 21:00;  Stop 5/29/17 at 20:59


Mineral Oil/White Petrolatum (Eucerin) to feet bilaterally TIDP  PRN TOP DRY 

SKIN;  Start 4/30/17 at 00:15;  Stop 5/30/17 at 00:14


Nitroglycerin (Nitrostat (1/ 150)) 0.4 mg Q5MP  PRN SL ANGINA;  Start 4/30/17 

at 00:15;  Stop 5/30/17 at 00:14


Nystatin (Mycostatin) To lower abdomen BID TOP  Last administered on 4/30/17at 

09:26;  Start 4/29/17 at 21:00;  Stop 5/29/17 at 20:59


Omeprazole (PriLOSEC) 40 mg DAILY PO  Last administered on 4/30/17at 09:24;  

Start 4/30/17 at 09:00;  Stop 5/30/17 at 08:59


Polyethylene Glycol (Miralax) 1 pkt DAILYPRN  PRN PO CONSTIPATION;  Start 4/30/ 17 at 00:15;  Stop 5/30/17 at 00:14


Potassium Chloride (Micro-K Extencaps) 20 meq TID PO  Last administered on 4/30/ 17at 09:22;  Start 4/29/17 at 21:00;  Stop 5/29/17 at 20:59


Salmeterol Xinafoate/ Fluticasone (Advair Diskus 250/50) 1 puff BID INH  Last 

administered on 4/30/17at 09:10;  Start 4/29/17 at 21:00;  Stop 5/29/17 at 20:59


Senna/Docusate Sodium (Senokot S) 2 tab BID PO  Last administered on 4/30/17at 

09:23;  Start 4/29/17 at 21:00;  Stop 5/29/17 at 20:59


Topiramate (TopAMAX) 50 mg BID PO  Last administered on 4/30/17at 09:23;  Start 

4/29/17 at 21:00;  Stop 5/29/17 at 20:59


Trazodone HCl (Desyrel) 100 mg QHS PO  Last administered on 4/30/17at 01:28;  

Start 4/29/17 at 21:00;  Stop 5/29/17 at 20:59





Allergies


Allergies:  


Coded Allergies:  


     Streptokinase (Unverified  Allergy, Severe, BLOOD CLOTS, 3/30/16)


     Amiloride (Verified  Allergy, Intermediate, RASH/GENERAL ITCHING, 4/8/13)


 RASH/GENERAL ITCHING


     Guaifenesin & Derivatives (Unverified  Allergy, Intermediate, FACE SWELLING

, 3/30/16)


     Shellfish Allergy (Verified  Allergy, Intermediate, RASH, 4/8/13)


     Spironolactone (Unverified  Allergy, Intermediate, RASH, 4/8/13)


     Vancomycin (Unverified  Allergy, Intermediate, HIVES,SWELLING,RASH, 4/8/13)


     Gabapentin (Verified  Adverse Reaction, Severe, SUICIDAL IDEATIONS, 4/8/13)





Vital Signs/I&O





Vital Signs








  Date Time  Temp Pulse Resp B/P (MAP) Pulse Ox O2 Delivery O2 Flow Rate FiO2


 


4/30/17 09:24  74  142/72    


 


4/30/17 06:00 98.8  16  93 Nasal Cannula 2.0 














I&O- Last 24 Hours up to 6 AM 


 


 4/30/17





 06:00


 


Intake Total 240 ml


 


Output Total 1600 ml


 


Balance -1360 ml











Laboratory Data


24H Labs


Laboratory Tests 2


4/29/17 18:41: 


White Blood Count 4.2, Red Blood Count 3.80L, Hemoglobin 9.7L, Hematocrit 32.3L

, Mean Corpuscular Volume 85.2, Mean Corpuscular Hemoglobin 25.7L, Mean 

Corpuscular Hemoglobin Concent 30.2L, Red Cell Distribution Width 17.4H, 

Platelet Count 105L, Neutrophils (%) (Auto) 68.2H, Lymphocytes (%) (Auto) 15.9L

, Monocytes (%) (Auto) 8.1H, Eosinophils (%) (Auto) 3.9H, Basophils (%) (Auto) 

0.7, Neutrophils # (Auto) 2.9, Lymphocytes # (Auto) 0.7L, Monocytes # (Auto) 0.3

, Eosinophils # (Auto) 0.2, Basophils # (Auto) 0.0, Large Unclassified Cells % 

3.1, Large Unclassified Cells # 0.1, Prothrombin Time 14.8H, Prothromb Time 

International Ratio 1.15, Activated Partial Thromboplast Time 35.7, Anion Gap 6L

, Glomerular Filtration Rate 18.8L, Calcium Level 7.8L, Aspartate Amino Transf (

AST/SGOT) 28, Alanine Aminotransferase (ALT/SGPT) 20, Alkaline Phosphatase 128H

, Total Bilirubin 0.2, Direct Bilirubin 0.1, Total Protein 6.6, Albumin 2.8L, 

Albumin/Globulin Ratio 0.74L, Lipase 168


4/29/17 19:59: 


Urine Appearance CLEAR, Urine Color YELLOW, Urine pH 6.0, Urine Specific 

Gravity 1.009, Urine Protein NEGATIVE, Urine Glucose (UA) NEGATIVE, Urine 

Ketones NEGATIVE, Urine Urobilinogen 0.2, Urine Bilirubin NEGATIVE, Urine 

Leukocyte Esterase NEGATIVE, Urine Blood NEGATIVE, Urine Nitrite NEGATIVE, 

Urine WBC (Auto) 1, Urine RBC (Auto) 1, Urine Hyaline Casts (Auto) 0, Urine 

Bacteria (Auto) NEGATIVE, Urine Squamous Epithelial Cells 0, Urine Sperm (Auto) 


4/30/17 01:05: Bedside Glucose (Misc Panel) 200H


4/30/17 05:27: 


Anion Gap 7L, Glomerular Filtration Rate 22.2L, Calcium Level 7.9L, Blood Urea 

Nitrogen 44H, Creatinine 2.34H, Sodium Level 144, Potassium Level 3.4L, 

Chloride Level 107, Carbon Dioxide Level 30


CBC/BMP


Laboratory Tests


4/29/17 18:41








Red Blood Count 3.80 L, Mean Corpuscular Volume 85.2, Mean Corpuscular 

Hemoglobin 25.7 L, Mean Corpuscular Hemoglobin Concent 30.2 L, Red Cell 

Distribution Width 17.4 H, Neutrophils (%) (Auto) 68.2 H, Lymphocytes (%) (Auto

) 15.9 L, Monocytes (%) (Auto) 8.1 H, Eosinophils (%) (Auto) 3.9 H, Basophils (%

) (Auto) 0.7, Neutrophils # (Auto) 2.9, Lymphocytes # (Auto) 0.7 L, Monocytes # 

(Auto) 0.3, Eosinophils # (Auto) 0.2, Basophils # (Auto) 0.0





4/30/17 05:27








Red Blood Count 3.14 L, Mean Corpuscular Volume 84.9, Mean Corpuscular 

Hemoglobin 26.8 L, Mean Corpuscular Hemoglobin Concent 31.6 L, Red Cell 

Distribution Width 17.5 H, Calcium Level 7.9 L


Microbiology





Microbiology


4/29/17 Blood Culture, Received


          Pending


4/29/17 Blood Culture, Received


          Pending


4/29/17 Urine Culture - Final, Complete











GENEVA FELIPE MD Apr 30, 2017 13:22
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
## 2017-04-30 NOTE — CR
DATE OF CONSULTATION: 04/30/2017

 

REASON FOR CONSULTATION:

This 66-year-old female was evaluated in consultation as requested by Dr. Manley

on 04/30/2017, for acute urinary retention. She was admitted to the hospital

(04/29/2017) following a two-week history of abdominal discomfort, constipation

and decreased urine output. Evaluation for chronic ascites and liver cirrhosis is

in progress. Prior to the most recent onset of decreased urine output, there is

no history of voiding symptoms, gross hematuria, urinary tract infection,

urolithiasis, flank pain or constitutional symptoms. Victor catheter insertion in

the emergency department (04/29/2017) per urology resulted in drainage of 750 mL

of clear yellow urine. Victor catheter remains clear at present. As well, the

patient has had a 30-pound weight gain over the past two months. She has baseline

chronic renal failure (creatinine 2.0).

 

PAST MEDICAL HISTORY:

Is significant for diabetes mellitus with subsequent liver cirrhosis, coronary

artery disease, myocardial infarction times three status post coronary artery

bypass graft, seizure disorder, left total hip arthroplasty, cataracts, secondary

hyperparathyroidism, tonsillectomy, total abdominal hysterectomy and left

tibial-fibular repair.

 

REVIEW OF SYSTEMS:

Negative for hypertension, pulmonary pathology, headaches, cerebrovascular

accident (CVA), glaucoma, peptic ulcer disease, urolithiasis, cholelithiasis, or

blood borne diseases.

 

CURRENT MEDICATIONS:

Aspirin, omeprazole, ceftriaxone, albuterol, nitroglycerin, Lipitor, carvedilol,

trazodone, and insulin.

 

ALLERGIES:

She is allergic to AMILORIDE, SHELLFISH, SPIRONOLACTONE and VANCOMYCIN.

 

SOCIAL HISTORY:

She is  and has three children. She is a 30-pack-year smoker who quit

seven weeks previously. She does not consume alcohol.

 

FAMILY HISTORY:

Significant for diabetes mellitus and chronic renal failure on the paternal side.

 

 

PHYSICAL EXAMINATION:

General examination revealed a comfortable and obese individual. Her heart rate

was 74, respiratory rate was 16, blood pressure was 142/72, and temperature was

98.8 degrees Fahrenheit. Palpation of the head and neck failed to reveal the

presence of lymphadenopathy. Auscultation of chest was clear with normal heart

sounds and a systolic murmur. Examination of the back and abdomen demonstrated

ascites and obese individual. Bowel sounds were present. The abdomen was

otherwise benign. A Victor catheter demonstrated clear urine.

 

LABORATORY DATA:

Urinalysis (04/29/2017) demonstrated at pH of 6.0, with no evidence of

leukocytes, nitrites or microhematuria. A urine culture (04/29/2017) was negative

for bacterial growth. Serum hematologic and biochemical indices determination

(04/30/2017) demonstrated a hemoglobin of 8.4, leukocyte count 3.2, and a

creatinine of 2.3 (improved post Victor catheter insertion).

 

IMAGING:

Computed tomography of the abdomen and pelvis without intravenous contrast

(04/29/2017) demonstrated bilateral atrophic kidneys, ascites, well-positioned

Victor catheter, and liver cirrhosis. There was no evidence of nephrolithiasis or

hydro-ureteral nephrosis.

 

ASSESSMENT:

1. Acute urinary retention.

2. Acute on chronic renal failure.

3. Liver cirrhosis with ascites.

4. Diabetes mellitus.

5. Coronary artery disease, status post myocardial infarction times three.

6. Status post coronary artery bypass graft.

7. Secondary hyperparathyroidism.

8. Left hip total arthroplasty.

 

PLAN:

The above findings were discussed with the patient and hospitalist. Victor

catheter is straight draining which will be maintained. Following paracentesis,

she may be discharged home and will followup with urology in one week for a

voiding trial. Possible urodynamic studies evaluation may be required. Clean

intermittent catheterization was deferred secondary to patient body habitus.

Should you require additional information, please do not hesitate to contact me.

 

 

Thanking you for the confidence of your referral.

## 2017-04-30 NOTE — IPNPDOC
Subjective


Date Seen


The patient was seen on 4/30/17.





Subjective


Chief Complaint/HPI


The patient is a 66-year-old female admitted with a reason for visit of 

Cirrhosis Of Liver.


Events since last encounter


pt seen and examined, she still complaining of abd pain, unchanged, she denies 

any nausea or vomiting, states she has no constipation


Constitutional:  Denies: Chills, Fever, Night Sweats


Gastrointestinal:  Reports: Abdominal Pain, 


   Denies: Nausea, Vomiting, Diarrhea, Constipation, Melena, Hematochezia, 

Other Symptoms





Objective


Physical Examination


General Exam:  Positive: Alert, Cooperative, No Acute Distress


Eye Exam:  Positive: Conjunctiva & lids normal, EOMI, 


   Negative: Sclera icteric


ENT Exam:  Positive: Atraumatic, Mucous membr. moist/pink, Pharynx Normal


Chest Exam:  Positive: Clear to auscultation, Normal air movement


Heart Exam:  Positive: Rate Normal, Regular Rhythm, Murmurs (2/6 systolic murmur

), 


   Negative: Rubs


Telemetry:  Positive: No significant arrhythmia


Abdomen Exam:  Positive: Normal bowel sounds, Tenderness, Other (tense abdomen 

with diffuse tenderness throughout.), 


   Negative: Soft


Extremity Exam:  Positive: Edema (bilateral lower extremity edema, 1+, right 

worse than left), Normal pulses, Tenderness, 


   Negative: Clubbing, Cyanosis


Skin Exam:  Positive: Nl turgor and temperature, 


   Negative: Breakdown, Lesion, Pruritus


Neuro Exam:  Positive: Normal Speech, Cranial Nerves 3-12 NL


Psych Exam:  Positive: Mental status NL, Mood NL, Oriented x 3





Assessment /Plan


Problems





(1) Abdominal pain


Status:  Acute


Problem Text:  


* unknown etiology


* pt has been recently diagnosed with cirrhosis, she is to see dr Dowd but 

hasn't seen him yet


* she denies any history of etoh use


* had a recent paracentesis done 10 days ago


* will repeat paracentesis and send for culture and sensitivity


* pt is currently on rocephin





(2) Ascites


Status:  Acute


Problem Text:  


* s/p post paracentesis 10 days ago


* will repeat today





(3) Urinary retention


Status:  Acute


Problem Text:  


* per pt this has been on/off


* will discontinue santos after paracentesis and if she continues to have 

urinary retention, she will likely need urology f/u outpt





(4) Acute on chronic kidney failure


Status:  Acute


Response to Treatment:  Improving


Problem Text:  


* likely secondary to urine retention


* pt normally f/u with dr zapata





(5) Cirrhosis of liver


Status:  Chronic


(6) Dyslipidemia


Status:  Chronic


(7) Coronary artery disease


Status:  Chronic


(8) Insulin dependent diabetes mellitus


Status:  Chronic


(9) History of seizures


Status:  Chronic


(10) Obstructive sleep apnea


Status:  Chronic


(11) Gastroesophageal reflux disease


Status:  Chronic


(12) History of MI (myocardial infarction)


Status:  Resolved


(13) Secondary hyperparathyroidism (of renal origin)


Status:  Chronic





Plan/VTE


VTE Prophylaxis Ordered?:  Yes (teds and sequentials)





Plan/Urinary Catheter


Reason for insertion/continuin:  Acute obstruct/retention





VS, I&O, 24H, Fishbone


Vital Signs/I&O





Vital Signs








  Date Time  Temp Pulse Resp B/P (MAP) Pulse Ox O2 Delivery O2 Flow Rate FiO2


 


4/30/17 09:24  74  142/72    


 


4/30/17 06:00 98.8  16  93 Nasal Cannula 2.0 














I&O- Last 24 Hours up to 6 AM 


 


 4/30/17





 06:00


 


Intake Total 240 ml


 


Output Total 1600 ml


 


Balance -1360 ml











Laboratory Data


24H LABS


Laboratory Tests 2


4/29/17 18:41: 


White Blood Count 4.2, Red Blood Count 3.80L, Hemoglobin 9.7L, Hematocrit 32.3L

, Mean Corpuscular Volume 85.2, Mean Corpuscular Hemoglobin 25.7L, Mean 

Corpuscular Hemoglobin Concent 30.2L, Red Cell Distribution Width 17.4H, 

Platelet Count 105L, Neutrophils (%) (Auto) 68.2H, Lymphocytes (%) (Auto) 15.9L

, Monocytes (%) (Auto) 8.1H, Eosinophils (%) (Auto) 3.9H, Basophils (%) (Auto) 

0.7, Neutrophils # (Auto) 2.9, Lymphocytes # (Auto) 0.7L, Monocytes # (Auto) 0.3

, Eosinophils # (Auto) 0.2, Basophils # (Auto) 0.0, Large Unclassified Cells % 

3.1, Large Unclassified Cells # 0.1, Prothrombin Time 14.8H, Prothromb Time 

International Ratio 1.15, Activated Partial Thromboplast Time 35.7, Anion Gap 6L

, Glomerular Filtration Rate 18.8L, Calcium Level 7.8L, Aspartate Amino Transf (

AST/SGOT) 28, Alanine Aminotransferase (ALT/SGPT) 20, Alkaline Phosphatase 128H

, Total Bilirubin 0.2, Direct Bilirubin 0.1, Total Protein 6.6, Albumin 2.8L, 

Albumin/Globulin Ratio 0.74L, Lipase 168


4/29/17 19:59: 


Urine Appearance CLEAR, Urine Color YELLOW, Urine pH 6.0, Urine Specific 

Gravity 1.009, Urine Protein NEGATIVE, Urine Glucose (UA) NEGATIVE, Urine 

Ketones NEGATIVE, Urine Urobilinogen 0.2, Urine Bilirubin NEGATIVE, Urine 

Leukocyte Esterase NEGATIVE, Urine Blood NEGATIVE, Urine Nitrite NEGATIVE, 

Urine WBC (Auto) 1, Urine RBC (Auto) 1, Urine Hyaline Casts (Auto) 0, Urine 

Bacteria (Auto) NEGATIVE, Urine Squamous Epithelial Cells 0, Urine Sperm (Auto) 


4/30/17 01:05: Bedside Glucose (Misc Panel) 200H


4/30/17 05:27: 


Anion Gap 7L, Glomerular Filtration Rate 22.2L, Calcium Level 7.9L, Blood Urea 

Nitrogen 44H, Creatinine 2.34H, Sodium Level 144, Potassium Level 3.4L, 

Chloride Level 107, Carbon Dioxide Level 30


CBC/BMP


Laboratory Tests


4/29/17 18:41








Red Blood Count 3.80 L, Mean Corpuscular Volume 85.2, Mean Corpuscular 

Hemoglobin 25.7 L, Mean Corpuscular Hemoglobin Concent 30.2 L, Red Cell 

Distribution Width 17.4 H, Neutrophils (%) (Auto) 68.2 H, Lymphocytes (%) (Auto

) 15.9 L, Monocytes (%) (Auto) 8.1 H, Eosinophils (%) (Auto) 3.9 H, Basophils (%

) (Auto) 0.7, Neutrophils # (Auto) 2.9, Lymphocytes # (Auto) 0.7 L, Monocytes # 

(Auto) 0.3, Eosinophils # (Auto) 0.2, Basophils # (Auto) 0.0





4/30/17 05:27








Red Blood Count 3.14 L, Mean Corpuscular Volume 84.9, Mean Corpuscular 

Hemoglobin 26.8 L, Mean Corpuscular Hemoglobin Concent 31.6 L, Red Cell 

Distribution Width 17.5 H, Calcium Level 7.9 L


Microbiology





Microbiology


4/29/17 Blood Culture, Received


          Pending


4/29/17 Blood Culture, Received


          Pending


4/29/17 Urine Culture - Final, Complete











KAM MEJIA DO Apr 30, 2017 12:52

## 2017-05-01 NOTE — REP
Clinical:  Abdominal pain.

 

Comparison:  04/29/2017.

 

Findings:

Lung bases demonstrate stable cardiomegaly as well as mild chronic scattered

fibroatelectatic changes.

 

Continued evidence for cirrhosis with portal venous hypertension.  A mild to

moderate amount of ascites and subcutaneous edema is appreciated which is

considerably decreased when compared to 04/29/2017.  Spleen, pancreas,

gallbladder, bilateral adrenal glands and kidneys are relatively normal / stable.

Diffuse vascular calcifications noted throughout the abdomen and pelvis.  Small

nonobstructing intrarenal calculi are suggested without hydroureteronephrosis.

The enteric system is without obstruction or acute inflammatory process.  Victor

catheter in collapsed bladder. Musculoskeletal structures demonstrate age-related

degenerative changes without focal osseous abnormality along with evidence for

left hip replacement.

 

Impression:

1.  Continued evidence for cirrhosis and portal venous hypertension with mild to

moderate ascites and subcutaneous edema decreased compared to 04/29/2017.

2.  No new acute intra-abdominal or pelvic pathology appreciated

 

 

Signed by

Yuan Spring MD 05/01/2017 07:24 A

## 2017-05-01 NOTE — REP
LIVER ULTRASOUND:

 

HISTORY:  Cirrhosis.

 

There are no filling defects in the gallbladder.  The gallbladder wall is

thickened measuring 6.3 mm.  The common bile duct measures 3.1 mm.  The liver is

coarse in echogenicity.  The pancreas is not seen.  The kidneys are normal in

echogenicity.  The right kidney measures 5.1 cm in transverse by 4.5 cm in AP by

9.4 cm in cephalocaudal dimensions.  The left kidney measures 3.8 cm in

transverse by 3.9 cm in AP by 10.3 cm in cephalocaudal dimensions.  There is no

hydronephrosis or mass.  The spleen is enlarged measuring 18 cm.  The portal vein

is enlarged measuring 3.1 cm.  Normal hepatopetal flow is present.  The hepatic

veins show biphasic wave form.  Ascites is present.

 

IMPRESSION:

 

1.  Thickened gallbladder wall.

 

2.  Ascites is present.

 

3.  Findings consistent with cirrhosis.

 

 

Signed by

Nikolas Berry MD 05/01/2017 08:26 A

## 2017-05-01 NOTE — RO
DATE OF PROCEDURE:  04/30/2017

 

PREPROCEDURE DIAGNOSIS:  Ascites.

 

POSTPROCEDURE DIAGNOSIS:  Ascites.

 

PROCEDURE PERFORMED:  Ultrasound guided paracentesis.

 

PHYSICIAN PERFORMING PROCEDURE:  Dr. Tonia Osborn

 

ASSISTANT:  Registered Nurse Steff

 

SEDATION:  None.

 

VENTILATION:  Nasal cannula 2 liters.

 

ANESTHETIC:  1% local lidocaine.

 

ESTIMATED BLOOD LOSS:  Minimal.

 

The patient was placed in supine position with pillows under the left side of 
the

patient's back.  Ultrasound was used to locate pockets of ascites fluid.  It was

determined that the right lower quadrant had the best window.  Subsequently the

site is marked.  The patient was cleaned with ChloraPrep, prepped and draped in 
a

sterile manner.  1% local lidocaine was first given superficially and then deep.

The skin was nicked with a blade.  Traction was pulled on the skin and the

catheter was inserted with a needle.  After ascites fluid was expressed, plastic

catheter is slided through over the needle and the needle was removed.

Subsequently ascites fluid was obtained for diagnosis and further fluid was

removed for therapeutic.  Vital signs were taken after every

liter of fluid that was taken.  A total of 2.5 liters of fluid was taken out.

The procedure was terminated when fluid flow had stopped and could not be

reestablished with repositioning.  The patient's vital signs remained stable

during the procedure.  Subsequently the catheter was removed.  Tape was placed.

The patient tolerated the procedure with no complications.

EMELIA

## 2017-05-01 NOTE — REP
BILATERAL LOWER EXTREMITY DUPLEX VEINS:

 

HISTORY:  Swelling.

 

RIGHT LOWER EXTREMITY:

 

There are no filling defects in the deep venous system.  The deep venous system

is patent.  A Baker cyst is present.  The cyst measures 4.1 x 0.8 x 2.1 cm.

 

IMPRESSION:

 

There is no deep venous thrombosis.

 

LEFT LOWER EXTREMITY:

 

There are no filling defects in the deep venous system.  The deep venous system

is patent.  A Baker cyst is present.  The cyst measures 2.9 x 0.9 x 1.6 cm.

 

IMPRESSION:

 

There is no deep venous thrombosis.

 

 

Signed by

Nikolas Berry MD 05/01/2017 08:21 A

## 2017-05-01 NOTE — IPNPDOC
Subjective


Date Seen


The patient was seen on 5/1/17.





Subjective


Chief Complaint/HPI


The patient is a 66-year-old female admitted with a reason for visit of 

Cirrhosis Of Liver.


Constitutional:  Denies: Chills, Fever, Night Sweats


Musculoskeletal:  Reports: Back Pain, Joint Pain





Objective


Physical Examination


General Exam:  Positive: Alert, Cooperative, No Acute Distress


Eye Exam:  Positive: Conjunctiva & lids normal, EOMI


ENT Exam:  Positive: Atraumatic, Mucous membr. moist/pink, Pharynx Normal


Chest Exam:  Positive: Clear to auscultation, Normal air movement


Heart Exam:  Positive: Rate Normal, Regular Rhythm, Murmurs


Telemetry:  Positive: No significant arrhythmia


Abdomen Exam:  Positive: Normal bowel sounds, Tenderness, Other


Extremity Exam:  Positive: Edema, Normal pulses, Tenderness


Skin Exam:  Positive: Nl turgor and temperature


Neuro Exam:  Positive: Normal Speech, Cranial Nerves 3-12 NL


Psych Exam:  Positive: Mental status NL, Mood NL, Oriented x 3





Assessment /Plan


Problems





(1) Abdominal pain


Status:  Acute


Problem Text:  


* pt has been recently diagnosed with cirrhosis, she is to see dr Dowd but 

hasn't seen him yet


* she denies any history of etoh use


* had a recent paracentesis done 10 days ago


* s/p paracentesis on 4/30 PMNs elevated 


* pt is currently on rocephin





(2) Ascites


Status:  Acute


Problem Text:  


* s/p post paracentesis 10 days ago


* repeated yesterday





(3) Urinary retention


Status:  Acute


Problem Text:  


* per pt this has been on/off


* she was seen by urology yesterday, recommended to leave santos in and follow 

up with urology outpt





(4) Acute on chronic kidney failure


Status:  Acute


Response to Treatment:  Improving


Problem Text:  


* likely secondary to urine retention


* pt normally f/u with dr zapata





(5) Cirrhosis of liver


Status:  Chronic


(6) Dyslipidemia


Status:  Chronic


(7) Coronary artery disease


Status:  Chronic


(8) Insulin dependent diabetes mellitus


Status:  Chronic


(9) History of seizures


Status:  Chronic


(10) Obstructive sleep apnea


Status:  Chronic


(11) Gastroesophageal reflux disease


Status:  Chronic


(12) History of MI (myocardial infarction)


Status:  Resolved


(13) Secondary hyperparathyroidism (of renal origin)


Status:  Chronic


(14) Anemia in chronic kidney disease


Status:  Acute


Problem Text:  


* will transfuse 2 units of prbc


* pt follows up with dr majano





(15) Pancytopenia





Plan/VTE


VTE Prophylaxis Ordered?:  Yes (teds and sequentials)





Plan/Urinary Catheter


Reason for insertion/continuin:  Acute obstruct/retention





VS, I&O, 24H, Fishbone


Vital Signs/I&O





Vital Signs








  Date Time  Temp Pulse Resp B/P (MAP) Pulse Ox O2 Delivery O2 Flow Rate FiO2


 


5/1/17 18:56 99.1 69 18 140/80 (100) 98 Room Air  


 


4/30/17 16:00       2.0 














I&O- Last 24 Hours up to 6 AM 


 


 5/1/17





 06:00


 


Intake Total 830 ml


 


Output Total 2450 ml


 


Balance -1620 ml











Laboratory Data


24H LABS


Laboratory Tests 2


4/30/17 20:32: Bedside Glucose (Misc Panel) 112


5/1/17 05:18: 


Anion Gap 4L, Glomerular Filtration Rate 23.8L, Blood Urea Nitrogen 45H, 

Creatinine 2.20H, Sodium Level 146H, Potassium Level 3.7, Chloride Level 110H, 

Carbon Dioxide Level 32, Calcium Level 7.6L


5/1/17 11:35: Bedside Glucose (Misc Panel) 123H


5/1/17 16:55: Bedside Glucose (Misc Panel) 191H


CBC/BMP


Laboratory Tests


5/1/17 05:18








Red Blood Count 3.06 L, Mean Corpuscular Volume 83.5, Mean Corpuscular 

Hemoglobin 25.2 L, Mean Corpuscular Hemoglobin Concent 30.1 L, Red Cell 

Distribution Width 17.5 H, Calcium Level 7.6 L


Microbiology





Microbiology


4/29/17 Blood Culture - Preliminary, Resulted


          No growth after 24 hours . All specim...


4/29/17 Blood Culture - Preliminary, Resulted


          No growth after 24 hours . All specim...


4/30/17 Acid Fast Stain, Received


          Pending


4/30/17 Mycobacterial Culture, Received


          Pending


4/30/17 Fungal Smear, Received


          Pending


4/30/17 Fungal Culture, Received


          Pending


4/30/17 Gram Stain - Final, Resulted


          


4/30/17 Body Fluid Culture, Resulted


          Pending


4/29/17 Urine Culture - Final, Complete











KAM MEJIA DO May 1, 2017 20:16

## 2017-05-02 NOTE — DSES
DATE OF ADMISSION:   04/29/2017

DATE OF DISCHARGE:  05/02/2017

 

 

ATTENDING PHYSICIAN:  Dr. Steffi Zarco

 

CONSULTANTS:  Dr. Fredis Christensen, Urology

 

DISCHARGE DIAGNOSES:

1.  Cirrhosis of the liver.

2.  Ascites.

3.  Acute kidney injury, resolved.

4.  Urinary retention, acute.

5.  Coronary artery disease.

6.  Insulin-dependent diabetes.

7.  Dyslipidemia.

8.  Obstructive sleep apnea, compliant with continuous positive airway pressure

(CPAP).

9.  History of seizures.

10.  Gastroesophageal reflux disease (GERD).

11.  History of myocardial infarction.

12.  Anemia of chronic disease.

13.  Secondary hyperparathyroidism.

14.  Thrombocytopenia.

 

DISCHARGE MEDICATIONS:

- ciprofloxacin 500 mg every 12 hours for 4 days

- Flagyl 500 mg every 8 hours for 4 days

- Xifaxan 550 mg by mouth twice daily

- albuterol sulfate two puffs inhaled every 4 hours as needed

- albuterol sulfate nebulizer inhaled four times daily as needed

- ammonium lactate one dose topically for dry feet

- aspirin 325 mg daily

- atorvastatin 40 mg at night

- calcium 500 plus vitamin D one tablet by mouth twice daily

- carvedilol 25 mg by mouth twice daily

- vitamin D 50,000 units monthly

- folic acid 1 mg by mouth daily

- insulin sliding scale before meals

- Bacid one tablet by mouth twice daily

- Keppra 5000 mg by mouth twice a day

- nitroglycerin 0.4 mg sublingually as needed

- omeprazole 40 mg by mouth daily

- potassium chloride 20 mEq three times daily

- Advair Diskus one puff inhaled twice a day

- topiramate 50 mg by mouth twice a day

- torsemide 100 mg by mouth daily

- Toujeo 80 units subcutaneous twice daily

- trazodone 100 mg by mouth at night

 

BRIEF HOSPITAL COURSE:

The patient presented with abdominal pain and difficulty with bowel movement.

She was recently diagnosed with cirrhosis.  She had a paracentesis almost 2 weeks

ago.  During hospitalization she had another paracentesis with elevated PMNs and

was initiated on Rocephin. Paracentesis was done on 04/30/2017.  She also was

found to have acute urinary retention for which a Victor was placed.  She was seen

by urology and recommendations were to keep Victor in she would follow up as

outpatient for voiding trial.  Her acute kidney injury did improve back to

baseline after Victor placement.  She does have history of anemia of chronic

disease; however, her hemoglobin did drop to 7.7, requiring 2 units of packed red

blood cells with appropriate response.  She was placed on a bowel regimen and was

able to move her bowels and rectal pain resolved.

 

On day of discharge, the patient reported feeling well and was adamant about

going home.  She denied any chest pain, chest pressure, shortness of breath,

lightheadedness, dizziness, nausea, vomiting, diarrhea.  Abdominal pain had

significantly improved.  She continues to have a Victor in place, which she

reports is uncomfortable but no dysuria or hematuria.  She is afebrile and vitals

were stable.

 

 

Laboratory data on discharge:  WBC 3.8, hemoglobin 10.1, hematocrit 30.8,

platelet count 81, sodium 143, potassium 3.7, chloride 109, carbon dioxide 26,

anion gap 8, BUN 46, creatinine 2.1, GFR 25.7, fasting glucose 84, calcium 8.1.

 

 

Microbiology:  Blood culture showed no growth after 48 hours.  Urine culture was

negative.  Ascites fluid cultures are still pending.

 

IMAGING STUDIES:  The patient had an abdominal/pelvis CT on 04/292017, which

revealed evidence of cirrhosis and portal hypertension, massive amount of

abdominal and pelvis ascites, small bowel wall thickening.  She had a vascular

ultrasound on 04/30/2017, which showed no evidence of deep vein thrombosis (DVT)

bilaterally.  She had a liver ultrasound on 04/30/2017, which showed ascites and

findings consistent with cirrhosis and gallbladder wall was thickened.

 

Repeat abdominal/pelvis CT on 05/01/2917 revealed continued evidence for

cirrhosis and portal venous hypertension with mild to moderate ascites and

subcutaneous edema.  Compared to previous imaging, no new acute pathology.

 

PHYSICAL EXAMINATION

Discharge vital signs:  Temperature 98.7, pulse 75, respiratory rate 22, blood

pressure 156/77, pulse oximetry 92%.

GENERAL: The patient is alert and oriented times three.  No acute distress.

HEENT:  Normocephalic, atraumatic.  Extraocular muscles are intact.  Pupils are

equally round and reactive to light.  No scleral icterus.  Moist mucosa.

NECK:  Supple.  No cervical lymphadenopathy or thyromegaly.

HEART:  Normal S1, S2, regular rate and rhythm.

LUNGS:  Clear to auscultation bilaterally.  No rales, rhonchi or wheezing.

ABDOMEN:  Obese.  Soft.  Nontender.  Bowel sounds are present.  No rebound,

guarding or rigidity.

EXTREMITIES:  No cyanosis or edema.  Positive pedal pulses bilaterally.

SKIN:  Warm and dry.  No rashes noted.

NEUROLOGIC:  No focal deficits.  Cranial nerves II-XII are grossly intact.  Motor

and sensation intact.

 

DISCHARGE INSTRUCTIONS:   The patient is discharged in stable condition.  She is

to follow up with primary care physician in 3-5 days.  The office is not open

today and the patient will have to call tomorrow to set up that appointment.  She

should followup with Dr. Dowd in 5 days.  Followup with urology in 7 days.

Those offices will call her with an appointment.  [facility]  Followup with urology for voiding trial.  She gets nursing

assistance three times a week and PT twice a week and home health care has been

reinstated.  Activity as tolerated.  Low-sodium diet.  She should return to the

emergency department with any worsening or recurring symptoms.

 

Things to followup on:

 

Patient's ascites fluid cultures are still pending.  Blood cultures are now

finalized, they showed no growth after 48 hours.

 

Time spent on discharge was greater than 40 minutes.

 

My preceptor for this patient encounter was Dr. Zarco.  The preceptor was

physically present in the building during the encounter and was fully available.

As needed, all aspects of the patient interview, examination, medical decision

making process, and medical care plan development were reviewed and approved by

the preceptor.  The preceptor is aware and concurs with the plan as stated in the

body of this note and will attest to such by his/her cosignature.
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
## 2017-10-02 NOTE — REPUSA
CT of the abdomen and pelvis without contrast

Clinical statement: rectal bleeding.

Technique: Multiple axial CT images were obtained from the base of the lungs to the floor of the pelv
is utilizing 5 mm axial slices without administration of contrast. Coronal and sagittal reconstructio
ns were also obtained.

Comparison: 4/30/2017.

Findings:

Chest: The visualized lung bases are clear.

Abdomen: The kidneys are normal in size bilaterally. There is no evidence of hydronephrosis or nephro
lithiasis. The liver is nodular and enlarged, measuring at least 28 cm in longest diameter. The splee
n is enlarged as well, measuring 18.9 cm in diameter. The pancreas, gallbladder and adrenal glands ar
e unremarkable. The aorta demonstrates normal caliber and contour. Diffuse extensive atherosclerotic 
changes are appreciated. There is no abdominal lymphadenopathy. There is a large amount of upper abdo
dylna ascites.

Pelvis: The bowel is unremarkable, with no obstructive or inflammatory changes. The urinary bladder i
s within normal limits. There is no pelvic lymphadenopathy. There is a large amount of pelvic ascites
. The other pelvic structures appear unremarkable.

Bones: There are no suspicious osseous abnormalities seen. A left hip arthroplasty is intact. There i
s moderately severe degenerative disc disease at L2/L3.

Impression:

1. Severe stable hepatosplenomegaly. Nodularity in the liver suggests severe stable sclerosis.

2. Large amount of abdominal and pelvic ascites.

3. Diffuse atherosclerosis. No evidence of aortic aneurysm.

4. No evidence of hydronephrosis or nephrolithiasis.

5. No obstructive or inflammatory bowel changes.

6. Stable moderately severe degenerative disc disease at L2/L3.

     Electronically signed by AFSHAN REDD MD on 10/02/2017 10:37:44 PM ET

## 2017-10-03 NOTE — HPEPDOC
General


Date of Admission


Oct 3, 2017 at 00:56


Primary Care Physician:  JUSTIN KAMARA MD


Chief Complaint


The patient is a 66-year-old female admitted with a reason for visit of Gi 

Bleed.


Source:  Patient


Exam Limitations:  No limitations


Timing/Duration:  4-6 hours


Severity:  Moderate


Associated Symptoms:  Loss of appetite, Malaise, Weakness





History of Present Illness


67 y/o female with past medical history of liver cirrhosis secondary to DM2 (

although pt states more causes are actively being investigated), DM2, COPD, CHF

, CKD, chronic thrombocytopenia, HTN who presents today with complaint of 

bright red blood "Dripping out of rectum" as per pt. that occurred this morning 

when pt awoke and went to have a BM, pt states that there was no pain with her 

BM, no blood mixed in the stool. Pt has also noted abdomen distension and 

discomfort/pain for the past 3-5 days, she states she has had to have "fluid 

drawn off of abdomen" before on past presentation to the hospital. Denies 

recent fever, muscle aches or chills, denies change in weight aside from 

feeling as though she has gained some from the fluid in her abdomen, admits to 

decreased appetite, denies SOB but admits to chronic cough that is non-

productive that has been bothering her more for the past few days. Denies 

headache, change in vision, cp or palpations. She takes aspirin daily but 

denies other OTC medicine that could thin her blood. She states her BM have 

been normal and denied change in caliber of stool nor loose stool. States this 

is the first time shes had blood from her stool. States her last colonoscopy 

was about 5 years ago. Denies any pain w/ urination nor blood in urine although 

she was recently treated for a UTI with abx two weeks ago.





Home Medications


Scheduled


 (Toujeo Solostar) 300 Unit/Ml Inj, 18 UNIT SC BID, (Reported)


 (Eplerenone) 25 Mg Tab, 25 MG PO DAILY, (Reported)


Amlodipine Besylate (Amlodipine Besylate) 10 Mg Tab, 10 MG PO DAILY, (Reported)


Atorvastatin Calcium (Atorvastatin Calcium) 40 Mg Tab, 40 MG PO QPM, (Reported)


Calcium Carbonate (Calcium) 600 Mg Tab, 600 MG PO BID, (Reported)


Carvedilol (Carvedilol) 25 Mg Tab, 25 MG PO BID, (Reported)


Ciprofloxacin HCl (Cipro) 500 Mg Tab, 500 MG PO BID


Ergocalciferol (Vitamin D) 50,000 Unit Cap, 50,000 UNIT PO MTHLY, (Reported)


   TAKES AT BEGINNING OF EACH MONTH 


Ferrous Sulfate (Ferrous Sulfate) 325 Mg Tab, 325 MG PO BID, (Reported)


Folic Acid (Folic Acid) 1 Mg Tab, 1 MG PO DAILY, (Reported)


Insulin Aspart (Novolog) 100 U/Ml Inj, 1 DOSE SC AC, (Reported)


   PER SLIDING SCALE 


Levetiracetam (Keppra) 1,000 Mg Tab, 1,000 MG PO BID, (Reported)


Nystatin (Nystatin) 100,000 Unit/Gm Oin, 1 UNIT TOP BID, (Reported)


   APPLIES TO LOWER ABDOMEN 


Omeprazole (Omeprazole) 40 Mg Cap, 40 MG PO DAILY, (Reported)


Potassium Chloride (Klor-Con M20) 20 Meq Tabcr, 20 MEQ PO TID, (Reported)


Salmeterol/Fluticasone (Advair Diskus 250-50 Mcg/Dose) 14 Puff/Inhaler Aerp, 1 

PUFF INH BID, (Reported)


Tiotropium Bromide Monohydrate (Spiriva Handihaler) 18 Mcg Cap, 1 INHALATION 

INH DAILY, (Reported)


Topiramate (Topiramate) 50 Mg Tab, 50 MG PO BID, (Reported)


Torsemide (Torsemide) 100 Mg Tab, 100 MG PO DAILY, (Reported)


Torsemide (Torsemide) 100 Mg Tab, 150 MG PO QPM, (Reported)


   TAKES AT 1500 


Varenicline (Chantix) 1 Mg Tab, 1 MG PO BID, (Reported)





Scheduled PRN


Albuterol Sulfate (Ventolin Hfa) 200 Puff/8 Gm Aers, 2 PUFF INH Q4H PRN for 

SHORTNESS OF BREATH, (Reported)


Albuterol/Ipratropium (Ipratropium Bromide/Albut 0.5-2.5 (3) mg/3Ml) 1 Sol Sol, 

1 SOL INH Q6H PRN for SHORTNESS OF BREATH, (Reported)


Ammonium Lactate (Ammonium Lactate) 12 % Cre, 1 DOSE TOP BID PRN for DRY FEET, (

Reported)


Nitroglycerin (Nitrostat) 0.4 Mg Subl, 0.4 MG SL NITRO PRN for CHEST PAIN, (

Reported)





Allergies


Coded Allergies:  


     Streptokinase (Unverified  Allergy, Severe, BLOOD CLOTS, 3/30/16)


     Amiloride (Verified  Allergy, Intermediate, RASH/GENERAL ITCHING, 13)


 RASH/GENERAL ITCHING


     Guaifenesin & Derivatives (Unverified  Allergy, Intermediate, FACE SWELLING

, 3/30/16)


     Shellfish Allergy (Verified  Allergy, Intermediate, RASH, 13)


     Spironolactone (Unverified  Allergy, Intermediate, RASH, 13)


     Vancomycin (Unverified  Allergy, Intermediate, HIVES,SWELLING,RASH, 13)


     Gabapentin (Verified  Adverse Reaction, Severe, SUICIDAL IDEATIONS, 13)





Past Medical History


Medical History


CKD


CHF


DM2


chronic thrombocytopenia


liver cirrhosis


HTN


COPD


Surgical History


2x  


hysterectomy


right rotator cuff sx


 CABG 


left tib/fib surgery





Family History


Significant Family History:  No pertinent family hx





Social History


* Smoker:  former Smoker (quit one month ago, used to be 1ppd for >20 yrs)


Alcohol:  Denies


Drugs:  denies


lives by herself





Review of Symptoms


Constitutional:  Reports: Malaise, Weakness, Fatigue, 


   Denies: Chills, Fever, Night Sweats, Weight Loss


Eyes:  Denies: Pain, Vision change


ENT:  Denies: Head Aches


Pulmonary:  Reports: Cough, 


   Denies: Dyspnea, Pleuritic Chest Pain


Cardiovascular:  Denies: Chest Pain, Palpitations, Orthopnea, Paroxysmal Noc. 

Dyspnea, Edema, Lt Headedness


Gastrointestinal:  Reports: Abdominal Pain, Hematochezia, 


   Denies: Nausea, Vomiting, Diarrhea, Constipation, Melena


Genitourinary:  Denies: Dysuria, Frequency, Incontinence


Hematologic:  Reports: Bruising


Musculoskeletal:  Denies: Neck Pain


Neurological:  Reports: Weakness, 


   Denies: Numbness


Psych:  Reports: Mood Normal





Physical Examination


General Exam:  Positive: Alert, Cooperative, Mild Distress


Eye Exam:  Positive: Conjunctiva & lids normal, EOMI, 


   Negative: Sclera icteric, Ptosis


ENT Exam:  Positive: Mucous membr. moist/pink, Tongue Midline, Nares Patent, 


   Negative: Pharyngeal Edema


Neck Exam:  Positive: Supple


Chest Exam:  Positive: Wheezing (end exp wheeze RLL), Diminished, 


   Negative: Normal air movement, Rales, Rhonchi


Heart Exam:  Positive: Rate Normal, Normal S1, Normal S2, 


   Negative: Gallops, Murmurs, Rubs


Telemetry:  Positive: No significant arrhythmia


Abdomen Exam:  Positive: Normal bowel sounds, Soft, Tenderness, Other (+ fluid 

wave, + rebound ridgity, distended abdomen with diffuse tenderness througout ), 


   Negative: Hepatospenomegaly


Extremity Exam:  Negative: Clubbing, Cyanosis, Tenderness, Swelling


Skin Exam:  Positive: Other skin issue (bruising b/l UE, pt states she "easily 

bruises")


Neuro Exam:  Positive: Normal Speech


Psych Exam:  Positive: Mental status NL, Oriented x 3





Vital Signs





Vital Signs








  Date Time  Temp Pulse Resp B/P (MAP) Pulse Ox O2 Delivery O2 Flow Rate FiO2


 


10/3/17 01:49 98.1  20     


 


10/3/17 01:33  90   100   


 


10/2/17 20:19      Room Air  











Laboratory Data


Labs 24H


Laboratory Tests 2


10/2/17 20:32: 


Immature Granulocyte % (Auto) 0.6H, White Blood Count 7.1, Red Blood Count 3.57L

, Hemoglobin 9.2L, Hematocrit 30.0L, Mean Corpuscular Volume 84.0, Mean 

Corpuscular Hemoglobin 25.8L, Mean Corpuscular Hemoglobin Concent 30.7L, Red 

Cell Distribution Width 17.6H, Platelet Count 99L, Neutrophils (%) (Auto) 83.4H

, Lymphocytes (%) (Auto) 5.9L, Monocytes (%) (Auto) 8.0H, Eosinophils (%) (Auto

) 1.8, Basophils (%) (Auto) 0.3, Neutrophils # (Auto) 5.9, Lymphocytes # (Auto) 

0.4L, Monocytes # (Auto) 0.6, Eosinophils # (Auto) 0.1, Basophils # (Auto) 0.0, 

Immature Granulocyte # (Auto) 0.0, Nucleated Red Blood Cells % (auto) 0.0, 

Prothrombin Time 14.8H, Prothromb Time International Ratio 1.14, Activated 

Partial Thromboplast Time 36.4, Anion Gap 6L, Glomerular Filtration Rate 22.3L, 

Calcium Level 8.5L, Aspartate Amino Transf (AST/SGOT) 30, Alanine 

Aminotransferase (ALT/SGPT) 25, Alkaline Phosphatase 151H, Total Bilirubin 0.4, 

Direct Bilirubin 0.1, Total Protein 7.0, Albumin 3.1L, Albumin/Globulin Ratio 

0.79L, Lipase 102


10/3/17 02:21: Bedside Glucose (Misc Panel) 211H


CBC/BMP


Laboratory Tests


10/2/17 20:32








Red Blood Count 3.57 L, Mean Corpuscular Volume 84.0, Mean Corpuscular 

Hemoglobin 25.8 L, Mean Corpuscular Hemoglobin Concent 30.7 L, Red Cell 

Distribution Width 17.6 H, Neutrophils (%) (Auto) 83.4 H, Lymphocytes (%) (Auto

) 5.9 L, Monocytes (%) (Auto) 8.0 H, Eosinophils (%) (Auto) 1.8, Basophils (%) (

Auto) 0.3, Neutrophils # (Auto) 5.9, Lymphocytes # (Auto) 0.4 L, Monocytes # (

Auto) 0.6, Eosinophils # (Auto) 0.1, Basophils # (Auto) 0.0





10/3/17 01:44











Problems





(1) Bloody stool


Status:  Acute


Response to Treatment:  Stable


Problem Text:  h/h stable now 9. , will continue to trend


pt is consented for blood 


GI consulted-greatly appreciate their recommendations, will see pt in AM


Do not suspect currently bleeding, will begin protonix 





CT ab/pelvis showed: 


1. Severe stable hepatosplenomegaly. Nodularity in the liver suggests severe 

stable sclerosis.


2. Large amount of abdominal and pelvic ascites.


3. Diffuse atherosclerosis. No evidence of aortic aneurysm.


4. No evidence of hydronephrosis or nephrolithiasis.


5. No obstructive or inflammatory bowel changes.


6. Stable moderately severe degenerative disc disease at L2/L3.











(2) HTN (hypertension)


Status:  Chronic


Response to Treatment:  Stable


Problem Text:  174/82 


will c/w home amlodipine, atorvastatin and carvedilol 





(3) COPD (chronic obstructive pulmonary disease)


Status:  Chronic


Response to Treatment:  Stable


Problem Text:  c/w home inhalers and duo-neb therapy


pt is not on home O2


uses own Bipap at night





(4) CKD (chronic kidney disease)


Status:  Chronic


Response to Treatment:  Stable


Problem Text:  stable


creatine 2.3, baseline around 2


have begun gentle fluid hydration, pt is NPO





(5) Congestive heart failure (CHF)


Status:  Chronic


Response to Treatment:  Stable


Problem Text:  Stable, pt appears euvolemic. 


Will begin jose francisco hydration, pt is NPO


will hold home furosemide for now





 


2017 Echo showed: 


1. Normal left ventricular size with mildly increased left ventricular wall


thickness. Left ventricular systolic function is normal estimated at 60 to 65%.


2. Mildly enlarged left atrium. Normal right atrium and right ventricle.


3. The atrial septum appeared to be normal without evidence of defect or shunt.


4. Normal aortic root.


5. No pericardial effusion seen, but bilateral pleural effusion was noted.


6. Mildly calcified aortic valve with normal leaflet excursion. Mildly calcified


mitral annulus with normal anterior mitral valve leaflet motion. Normal 

tricuspid


valve. The pulmonic valve and proximal pulmonary artery branches were not well


visualized.


7. The inferior vena cava was normal in size, central venous pressure is most


likely normal.





(6) Obstructive sleep apnea


Status:  Chronic


Response to Treatment:  Stable


Problem Text:  pt may use own BIPAP 





(7) Cirrhosis of liver


Status:  Chronic


Response to Treatment:  Stable


Problem Text:  Seems as though etiology is still being investigated, as per pt. 

she states it is from her DM2


lab work was done recently that showed + anti-mitochondrial antibody, +DsDNA. 

Hep B and C lab negative. 


would require further investigation outpt f/u with GI





(8) Thrombocytopenia


Status:  Chronic


Response to Treatment:  Stable


Problem Text:  currently 99


seems baseline is 80-90's 


will continue to monitor


have held pts home asa


of note, seems pt is being worked up for multiple myeloma-will require further 

eval outpt. 





(9) Ascites


Status:  Acute


Response to Treatment:  Stable


Problem Text:  pt will need dx and therapeutic peritoneal tap in AM, have put 

in orders all ready for cytology and cell count, protein, glucose and albumin 

of fluid 


pt is NPO


GI consulted-greatly appreciate their recommendations 


Rocephin abx for SBP px. 





(10) DM2 (diabetes mellitus, type 2)


Status:  Chronic


Response to Treatment:  Stable


Problem Text:  pt takes Toujeao 18 BID at home, will schedule 8 BID as pt is BPO


Sliding scale coverage 





(11) DVT prophylaxis


Status:  Acute


Response to Treatment:  Stable


Problem Text:  scd teds








Plan / VTE


VTE Prophylaxis Ordered?:  Yes





GME ATTESTATION


GME ATTESTATION


My preceptor for this patient encounter was physically present in the building 

during the encounter and was fully available. As needed, all aspects of the 

patient interview, examination, medical decision making process, and medical 

care plan development were reviewed and approved by the preceptor. Preceptor is 

aware and concurs with the plan as stated in the body of this note and will 

attest to such by his/her cosignature.





ATTENDING NOTE


Patient seen and examined. Plan discussed with the Resident. Agree with the 

plan discussed above. 





MARCE Espinosa MD, DO Oct 3, 2017 03:30


JACLYN BUTCHER MD Oct 7, 2017 11:34

## 2017-10-03 NOTE — REP
Ultrasound-guided paracentesis

 

The procedure was performed under the direct supervision of Dr. Au.

 

The risks and benefits of the procedure were explained to the patient and

informed consent was obtained.  The largest pocket of fluid was localized in the

left flank using ultrasound guidance.  The skin was prepped and draped in a

sterile fashion.  1% lidocaine was used as a local anesthetic.  Using ultrasound

guidance an 8-[place] multi side-hole catheter was inserted using trocar

technique.  2,150 ml of clear savanna fluid was withdrawn and sent to the lab.

 

The the patient tolerated the procedure well and there were no immediate

complications.  After the appropriate amount of monitored convalescence the

patient was discharged from the department.

 

 

Reviewed by

STEVEN Nice 10/03/2017 03:14 PSigned by

Jhonny Au MD 10/03/2017 03:35 P

## 2017-10-03 NOTE — IPNPDOC
Subjective


Date Seen


The patient was seen on 10/3/17.





Subjective


Chief Complaint/HPI


The patient is a 66-year-old female admitted with a reason for visit of Gi 

Bleed.


Events since last encounter


Pt denies any more bleeding.  Notes some abd discomfort and distention.  Denies 

SOB, CP.


Constitutional:  Denies: Chills, Fever


Pulmonary:  Denies: Dyspnea


Cardiovascular:  Denies: Chest Pain


Gastrointestinal:  Reports: Abdominal Pain





Objective


Physical Examination


General Exam:  Positive: Alert, Cooperative, Mild Distress


Eye Exam:  Positive: Conjunctiva & lids normal, EOMI, 


   Negative: Sclera icteric, Ptosis


ENT Exam:  Positive: Mucous membr. moist/pink, Tongue Midline, Nares Patent, 


   Negative: Pharyngeal Edema


Neck Exam:  Positive: Supple


Chest Exam:  Positive: Diminished, 


   Negative: Normal air movement, Rales, Rhonchi


Heart Exam:  Positive: Rate Normal, Normal S1, Normal S2, 


   Negative: Gallops, Murmurs, Rubs


Telemetry:  Positive: No significant arrhythmia


Abdomen Exam:  Positive: Normal bowel sounds, Soft, Tenderness, Other (+ fluid 

wave, + rebound ridgity, distended abdomen with diffuse tenderness througout ), 


   Negative: Hepatospenomegaly


Extremity Exam:  Negative: Clubbing, Cyanosis, Tenderness, Swelling


Neuro Exam:  Positive: Normal Speech


Psych Exam:  Positive: Mental status NL, Oriented x 3





Assessment /Plan


Problems





(1) Bloody stool


Status:  Acute


Response to Treatment:  Stable


Problem Text:  10/3 - GI has been consulted.  Pt denies any more bleeding.  Hgb 

is down to 8.4.  Consider transfusion if drops further.  Serial CBCs ordered.








h/h stable now 9.2/30 , will continue to trend


pt is consented for blood 


GI consulted-greatly appreciate their recommendations, will see pt in AM


Do not suspect currently bleeding, will begin protonix 





CT ab/pelvis showed: 


1. Severe stable hepatosplenomegaly. Nodularity in the liver suggests severe 

stable sclerosis.


2. Large amount of abdominal and pelvic ascites.


3. Diffuse atherosclerosis. No evidence of aortic aneurysm.


4. No evidence of hydronephrosis or nephrolithiasis.


5. No obstructive or inflammatory bowel changes.


6. Stable moderately severe degenerative disc disease at L2/L3.











(2) HTN (hypertension)


Status:  Chronic


Response to Treatment:  Stable


Problem Text:  10/3- On Norvasc 10 mg daily and Coreg 25 mg daily.











174/82 


will c/w home amlodipine, atorvastatin and carvedilol 





(3) COPD (chronic obstructive pulmonary disease)


Status:  Chronic


Response to Treatment:  Stable


Problem Text:  10/3 - On Advair and Spiriva and albuterol.











c/w home inhalers and duo-neb therapy


pt is not on home O2


uses own Bipap at night





(4) CKD (chronic kidney disease)


Status:  Chronic


Response to Treatment:  Stable


Problem Text:  10/3 - Creat 2.23.  Getting gentle IV hydration at 70 cc/hr.











stable


creatine 2.3, baseline around 2


have begun gentle fluid hydration, pt is NPO





(5) Congestive heart failure (CHF)


Status:  Chronic


Response to Treatment:  Stable


Problem Text:  Stable, pt appears euvolemic. 


Will begin jose francisco hydration, pt is NPO


will hold home furosemide for now





 


4/2017 Echo showed: 


1. Normal left ventricular size with mildly increased left ventricular wall


thickness. Left ventricular systolic function is normal estimated at 60 to 65%.


2. Mildly enlarged left atrium. Normal right atrium and right ventricle.


3. The atrial septum appeared to be normal without evidence of defect or shunt.


4. Normal aortic root.


5. No pericardial effusion seen, but bilateral pleural effusion was noted.


6. Mildly calcified aortic valve with normal leaflet excursion. Mildly calcified


mitral annulus with normal anterior mitral valve leaflet motion. Normal 

tricuspid


valve. The pulmonic valve and proximal pulmonary artery branches were not well


visualized.


7. The inferior vena cava was normal in size, central venous pressure is most


likely normal.





(6) Obstructive sleep apnea


Status:  Chronic


Response to Treatment:  Stable


Problem Text:  pt may use own BIPAP 





(7) Cirrhosis of liver


Status:  Chronic


Response to Treatment:  Stable


Problem Text:  10/3 - GI has been consulted








Seems as though etiology is still being investigated, as per pt. she states it 

is from her DM2


lab work was done recently that showed + anti-mitochondrial antibody, +DsDNA. 

Hep B and C lab negative. 


would require further investigation outpt f/u with GI





(8) Thrombocytopenia


Status:  Chronic


Response to Treatment:  Stable


Problem Text:  10/3 - Plts 77











currently 99


seems baseline is 80-90's 


will continue to monitor


have held pts home asa


of note, seems pt is being worked up for multiple myeloma-will require further 

eval outpt. 





(9) Ascites


Status:  Acute


Response to Treatment:  Stable


Problem Text:  10/3 - GI has been consulted.











pt will need dx and therapeutic peritoneal tap in AM, have put in orders all 

ready for cytology and cell count, protein, glucose and albumin of fluid 


pt is NPO


GI consulted-greatly appreciate their recommendations 


Rocephin abx for SBP px. 





(10) DM2 (diabetes mellitus, type 2)


Status:  Chronic


Response to Treatment:  Stable


Problem Text:  10/3 On SSI.  Pt started on Levemir 8 units BID (on Toujeo 18 

units BID at home).











pt takes Toujeao 18 BID at home, will schedule 8 BID as pt is BPO


Sliding scale coverage 





(11) DVT prophylaxis


Status:  Acute


Response to Treatment:  Stable


Problem Text:  scd teds








Plan/VTE


VTE Prophylaxis Ordered?:  Yes





VS, I&O, 24H, Fishbone


Vital Signs/I&O





Vital Signs








  Date Time  Temp Pulse Resp B/P (MAP) Pulse Ox O2 Delivery O2 Flow Rate FiO2


 


10/3/17 04:00 98.7 83 18 159/72 (101) 92 NIPPV (BIPAP/CPAP)  


 


10/3/17 04:00       2.0 














I&O- Last 24 Hours up to 6 AM 


 


 10/4/17





 06:00


 


Intake Total 0 ml


 


Output Total 300 ml


 


Balance -300 ml











Laboratory Data


24H LABS


Laboratory Tests 2


10/2/17 20:32: 


Immature Granulocyte % (Auto) 0.6H, White Blood Count 7.1, Red Blood Count 3.57L

, Hemoglobin 9.2L, Hematocrit 30.0L, Mean Corpuscular Volume 84.0, Mean 

Corpuscular Hemoglobin 25.8L, Mean Corpuscular Hemoglobin Concent 30.7L, Red 

Cell Distribution Width 17.6H, Platelet Count 99L, Neutrophils (%) (Auto) 83.4H

, Lymphocytes (%) (Auto) 5.9L, Monocytes (%) (Auto) 8.0H, Eosinophils (%) (Auto

) 1.8, Basophils (%) (Auto) 0.3, Neutrophils # (Auto) 5.9, Lymphocytes # (Auto) 

0.4L, Monocytes # (Auto) 0.6, Eosinophils # (Auto) 0.1, Basophils # (Auto) 0.0, 

Immature Granulocyte # (Auto) 0.0, Nucleated Red Blood Cells % (auto) 0.0, 

Prothrombin Time 14.8H, Prothromb Time International Ratio 1.14, Activated 

Partial Thromboplast Time 36.4, Anion Gap 6L, Glomerular Filtration Rate 22.3L, 

Calcium Level 8.5L, Aspartate Amino Transf (AST/SGOT) 30, Alanine 

Aminotransferase (ALT/SGPT) 25, Alkaline Phosphatase 151H, Total Bilirubin 0.4, 

Direct Bilirubin 0.1, Total Protein 7.0, Albumin 3.1L, Albumin/Globulin Ratio 

0.79L, Lipase 102


10/3/17 02:21: Bedside Glucose (Misc Panel) 211H


10/3/17 04:45: 


Anion Gap 6L, Glomerular Filtration Rate 23.5L, Calcium Level 8.3L, Aspartate 

Amino Transf (AST/SGOT) 23, Alanine Aminotransferase (ALT/SGPT) 21, Alkaline 

Phosphatase 131H, Total Bilirubin 0.3, Total Protein 6.6, Albumin 2.8L, Albumin/

Globulin Ratio 0.74L, Estimated Mean Plasma Glucose 148H, Hemoglobin A1c 6.8H, 

Blood Urea Nitrogen 42H, Creatinine 2.23H, Sodium Level 140, Potassium Level 3.6

, Chloride Level 107, Carbon Dioxide Level 27, Magnesium Level 1.9


CBC/BMP


Laboratory Tests


10/2/17 20:32








Red Blood Count 3.57 L, Mean Corpuscular Volume 84.0, Mean Corpuscular 

Hemoglobin 25.8 L, Mean Corpuscular Hemoglobin Concent 30.7 L, Red Cell 

Distribution Width 17.6 H, Neutrophils (%) (Auto) 83.4 H, Lymphocytes (%) (Auto

) 5.9 L, Monocytes (%) (Auto) 8.0 H, Eosinophils (%) (Auto) 1.8, Basophils (%) (

Auto) 0.3, Neutrophils # (Auto) 5.9, Lymphocytes # (Auto) 0.4 L, Monocytes # (

Auto) 0.6, Eosinophils # (Auto) 0.1, Basophils # (Auto) 0.0





10/3/17 01:44








10/3/17 04:45








Red Blood Count 3.21 L, Mean Corpuscular Volume 84.1, Mean Corpuscular 

Hemoglobin 26.2 L, Mean Corpuscular Hemoglobin Concent 31.1 L, Red Cell 

Distribution Width 17.6 H, Calcium Level 8.3 L, Aspartate Amino Transf (AST/SGOT

) 23, Alanine Aminotransferase (ALT/SGPT) 21, Alkaline Phosphatase 131 H, Total 

Bilirubin 0.3, Total Protein 6.6, Albumin 2.8 L











Leeroy Recio Oct 3, 2017 08:26

## 2017-10-04 NOTE — CR.PDOC
[facility] Consultation


Consultation


DATE OF CONSULTATION: Oct 3, 2017 at 12:18


Primary physician/ hospitalist: Dr. Recio


Reason for consult: Liver cirrhosis and rectal bleeding. 





HPI: 


66 year old female patient with liver cirrhosis ( unclear etiology, patient 

denies alcohol use) CTP B, MELD 12), DM,  COPD, CHF, CKD ( Cr- 2- 3), HTN , 

CABG ( on high dose ASA),  presented to ER for complaints of multiple episodes 

of blood in stool  painless, bright red blood, not associated with stools. 

Patient was admitted for the same and GI consulted. Patient reports that since 

she is in ER, she did not have any further episodes of rectal bleeding. She 

also reports that her abdomen is distended for last few months and was taking 

high dose Lasix at home. Patient underwent diagnostic paracentesis in this 

admission. 





Pertinent negative GI symptoms:


Patient denies nausea, vomiting, diarrhea, abdominal pain, loss of appetite, 

early satiety or unintentional weight loss. No history of hematemesis, melena. 


Patient reports regular bowel movements.





Review of Systems:


GI: as stated above 


CVS: No chest pain, No palpitations, No leg swelling.


RS: No Shortness of breath, No Wheezing, no cough


CNS: No dizziness, No motor weakness, No sensory problems 


Hematology: No bruising, No gum bleeding, 


Musculoskeletal: No joint pain, ambulating well.


Skin: No rash 


: No hematuria, No burning sensation of the urine 


ENT:  No ear discharge/ pain, No dysphagia.


Eyes: No photophobia.





Home medications: reviewed. Antithrombotic agents   mg  on hold. 


Medical h/o: As above.


Surgical h/o: C- sections, Hysterectomy in past.  


Social h/o: Alcohol  denies  , smoking  quit few months ago , IVDA/ drugs  

Denies.  . 


Family h/o of GI cancers - None


Prior Endoscopies: 


--- EGD  None


--- Colonoscopy  More than 5 years ago by Dr. Dowd as per patient. No 

available results. 





Prior GI evaluation: None.





Exam: 


Vitals: reviewed 


General: Alert and oriented x 3, not in distress


HEENT: NO pallor, no icterus. Normal oropharynx, NO cervical lymph nodes.


Chest: symmetric with bilateral clear air entry,


CVS: S1, S2 heard, normal, no murmurs .


Abdomen:  Distended, shifting dullness, no surgical scars, soft, non-tender, 

Palpable liver and splenomegaly,, normal bowel sounds heard.


Rectal exam: Patient refused / Deferred at this time in view of scheduled 

colonoscopy.


Extremities: no pedal edema, pulses palpable.


CNS: no focal motor or sensory deficits. Moves all extremities


Skin: no rash.





Labs: reviewed.


Ascitic fluid analysis- SAAG > 1.1 and total protein >2.5.


Total WBC > 500. 


HAV and HBV and HCV negative.





Imaging: reviewed





Impression:





- Acute lower GI bleeding  painless  likely diverticular bleeding. Stopped 

since hospitalization and slight drop in Hb.HCT from baseline. Less likely 

upper GI bleeding. 


- Liver cirrhosis ( unclear etiology DDx NAFLD vs Autoimmune vs metabolic) CTP B

, MELD 12. With ascites. No prior EGD to r/o varices.


- SBP cannot be rule out based on the ascitic fluid analysis. 








Recommendations:





- Patient educated about the test results, possible differential diagnoses and 

All questions answered.


- Cotniue Antibiotics  for atleast 5 days. ( ceftriaxone while in-patient and 

can be switch to oral Ciprofloxacin if being discharged). 


- Give albumin 1gm/ kg in divided doses on Day 3 of antibiotics use. ( on 10/4/

2017). 


- Obtain Autoimmune and metabolic hepatitis work up (ordered).


- Patient educated about strict abstinence from alcohol / smoking.


- Monitor Hb.HCT and transfuse if needed to keep Hemoglobin around 8. ( do not 

over transfuse). 


- Continue PPI for now.


- Patient will be scheduled for EGD and Colonoscopy on 10/4/2017 for further 

evaluation .


- The procedures, indications, risks (bleeding, perforation, infection, 

hypotension, respiratory depression, allergy, need for endotracheal intubation, 

surgery, colostomy, cardiac arrest, even death), benefits, limitations (e.g., 

missing a lesion), and all other alternatives (including no intervention) were 

explained to the patient who understood and agreed for the procedures. 


- Please obtain one bag of cross matched platelets ready in blood bank.


- Golytely 4 litres to be completed by 8 AM ( ordered).


- Dulcolax 20 mg at 8 PM today. 


- NPO after 8 AM.





Plan of care discussed with patient and primary team and patient verbalized 

understanding.





Laboratory Data


Microbiology





Microbiology


10/3/17 Acid Fast Stain, Received


          Pending


10/3/17 Mycobacterial Culture, Received


          Pending


10/3/17 Fungal Smear, Received


          Pending


10/3/17 Fungal Culture, Received


          Pending


10/3/17 Gram Stain - Final, Resulted


          


10/3/17 Body Fluid Culture, Resulted


          Pending





Allergies


Coded Allergies:  


     Streptokinase (Unverified  Allergy, Severe, BLOOD CLOTS, 3/30/16)


     Amiloride (Verified  Allergy, Intermediate, RASH/GENERAL ITCHING, 4/8/13)


 RASH/GENERAL ITCHING


     Guaifenesin & Derivatives (Unverified  Allergy, Intermediate, FACE SWELLING

, 3/30/16)


     Shellfish Allergy (Verified  Allergy, Intermediate, RASH, 4/8/13)


     Spironolactone (Unverified  Allergy, Intermediate, RASH, 4/8/13)


     Vancomycin (Unverified  Allergy, Intermediate, HIVES,SWELLING,RASH, 4/8/13)


     Gabapentin (Verified  Adverse Reaction, Severe, SUICIDAL IDEATIONS, 4/8/13)





Home Medications


Scheduled


 (Toujeo Solostar) 300 Unit/Ml Inj, 18 UNIT SC BID, (Reported)


 (Eplerenone) 25 Mg Tab, 25 MG PO DAILY, (Reported)


Amlodipine Besylate (Amlodipine Besylate) 10 Mg Tab, 10 MG PO DAILY, (Reported)


Aspirin (Aspirin EC) 325 Mg Tabec, 325 MG PO DAILY, (Reported)


Atorvastatin Calcium (Atorvastatin Calcium) 40 Mg Tab, 40 MG PO QPM, (Reported)


Calcium Carbonate (Calcium) 600 Mg Tab, 600 MG PO BID, (Reported)


Carvedilol (Carvedilol) 25 Mg Tab, 25 MG PO BID, (Reported)


Ergocalciferol (Vitamin D) 50,000 Unit Cap, 50,000 UNIT PO MTHLY, (Reported)


   TAKES AT BEGINNING OF EACH MONTH 


Ferrous Sulfate (Ferrous Sulfate) 325 Mg Tab, 325 MG PO BID, (Reported)


Folic Acid (Folic Acid) 1 Mg Tab, 1 MG PO DAILY, (Reported)


Insulin Aspart (Novolog) 100 U/Ml Inj, 1 DOSE SC AC, (Reported)


   PER SLIDING SCALE 


Levetiracetam (Keppra) 1,000 Mg Tab, 1,000 MG PO BID, (Reported)


Nystatin (Nystatin) 100,000 Unit/Gm Oin, 1 UNIT TOP BID, (Reported)


   APPLIES TO LOWER ABDOMEN 


Omeprazole (Omeprazole) 40 Mg Cap, 40 MG PO DAILY, (Reported)


Potassium Chloride (Klor-Con M20) 20 Meq Tabcr, 20 MEQ PO TID, (Reported)


Salmeterol/Fluticasone (Advair Diskus 250-50 Mcg/Dose) 14 Puff/Inhaler Aerp, 1 

PUFF INH BID, (Reported)


Tiotropium Bromide Monohydrate (Spiriva Handihaler) 18 Mcg Cap, 1 INHALATION 

INH DAILY, (Reported)


Topiramate (Topiramate) 50 Mg Tab, 50 MG PO BID, (Reported)


Torsemide (Torsemide) 100 Mg Tab, 100 MG PO DAILY, (Reported)


Torsemide (Torsemide) 100 Mg Tab, 150 MG PO QPM, (Reported)


   TAKES AT 1500 


Varenicline (Chantix) 1 Mg Tab, 1 MG PO BID, (Reported)





Scheduled PRN


Albuterol Sulfate (Ventolin Hfa) 200 Puff/8 Gm Aers, 2 PUFF INH Q4H PRN for 

SHORTNESS OF BREATH, (Reported)


Albuterol/Ipratropium (Ipratropium Bromide/Albut 0.5-2.5 (3) mg/3Ml) 1 Sol Sol, 

1 SOL INH Q6H PRN for SHORTNESS OF BREATH, (Reported)


Ammonium Lactate (Ammonium Lactate) 12 % Cre, 1 DOSE TOP BID PRN for DRY FEET, (

Reported)


Nitroglycerin (Nitrostat) 0.4 Mg Subl, 0.4 MG SL NITRO PRN for CHEST PAIN, (

Reported)











LUTHER LIGHT MD Oct 4, 2017 01:18

## 2017-10-04 NOTE — ROOR
________________________________________________________________________________

Patient Name: Kristie Jarrett            Procedure Date: 10/4/2017 6:09 PM

MRN: [id number]                          Account Number: [id number]

YOB: 1950               Age: 66

Room: Main OR                          Gender: Female

Note Status: Finalized                 

________________________________________________________________________________

 

Procedure:           Upper GI endoscopy

Indications:         Hematochezia, Gastrointestinal bleeding of unknown origin

Providers:           Gian Charles MD

Referring MD:        Jazlyn Kc MD

Requesting Provider: 

Medicines:           Monitored Anesthesia Care

Complications:       No immediate complications.

________________________________________________________________________________

Procedure:           Pre-Anesthesia Assessment:

                     - Prior to the procedure, a History and Physical was 

                     performed, and patient medications and allergies were 

                     reviewed. The patient is competent. The risks and 

                     benefits of the procedure and the sedation options and 

                     risks were discussed with the patient. All questions were 

                     answered and informed consent was obtained. Patient 

                     identification and proposed procedure were verified by 

                     the physician, the nurse and the anesthetist in the 

                     procedure room. Mental Status Examination: alert and 

                     oriented. Airway Examination: normal oropharyngeal airway 

                     and neck mobility. Respiratory Examination: clear to 

                     auscultation. CV Examination: normal. Prophylactic 

                     Antibiotics: The patient does not require prophylactic 

                     antibiotics. Prior Anticoagulants: The patient has taken 

                     no previous anticoagulant or antiplatelet agents. ASA 

                     Grade Assessment: III - A patient with severe systemic 

                     disease. After reviewing the risks and benefits, the 

                     patient was deemed in satisfactory condition to undergo 

                     the procedure. The anesthesia plan was to use monitored 

                     anesthesia care (MAC). Immediately prior to 

                     administration of medications, the patient was 

                     re-assessed for adequacy to receive sedatives. The heart 

                     rate, respiratory rate, oxygen saturations, blood 

                     pressure, adequacy of pulmonary ventilation, and response 

                     to care were monitored throughout the procedure. The 

                     physical status of the patient was re-assessed after the 

                     procedure.

                     The Endoscope was introduced through the mouth, and 

                     advanced to the second part of duodenum. The upper GI 

                     endoscopy was accomplished without difficulty. The 

                     patient tolerated the procedure well.

                                                                                

Findings:

     LA Grade C (one or more mucosal breaks continuous between tops of 2 or 

     more mucosal folds, less than 75% circumference) esophagitis with no 

     bleeding was found in the lower third of the esophagus.

     There is no endoscopic evidence of varices in the entire esophagus.

     No gross lesions were noted in the entire examined stomach.

     The duodenal bulb and second portion of the duodenum were normal.

                                                                                

Impression:          - LA Grade C reflux esophagitis.

                     - No gross lesions in the stomach.

                     - Normal duodenal bulb and second portion of the duodenum.

                     - No specimens collected.

Recommendation:      - [facility].

                     - Written discharge instructions were provided to the 

                     patient.

                     - Low sodium diet.

                     - Continue present medications.

                     - Use Protonix (pantoprazole) 40 mg PO daily for 12 weeks.

                     - Follow an antireflux regimen.

                     - Repeat upper endoscopy in 3 months to check healing.

                     - Return to GI office at appointment to be scheduled.

                     - Return to primary care physician.

                                                                                

 

Gian Charles MD

_______________________

Gian Charles MD

10/4/2017 7:55:53 PM

This report has been signed electronically.

Number of Addenda: 0

 

Note Initiated On: 10/4/2017 6:09 PM

Estimated Blood Loss:

     Estimated blood loss: none.

## 2017-10-04 NOTE — ROOR
________________________________________________________________________________

Patient Name: Kristie Jarrett            Procedure Date: 10/4/2017 6:34 PM

MRN: [id number]                          Account Number: [id number]

YOB: 1950               Age: 66

                                       Gender: Female

Note Status: Finalized                 

________________________________________________________________________________

 

Procedure:           Colonoscopy

Indications:         Rectal bleeding

Providers:           Gian Charles MD

Referring MD:        Jazlyn Kc MD

Requesting Provider: 

Medicines:           Monitored Anesthesia Care

Complications:       No immediate complications.

________________________________________________________________________________

Procedure:           Pre-Anesthesia Assessment:

                     - Prior to the procedure, a History and Physical was 

                     performed, and patient medications and allergies were 

                     reviewed. The patient is competent. The risks and 

                     benefits of the procedure and the sedation options and 

                     risks were discussed with the patient. All questions were 

                     answered and informed consent was obtained. Patient 

                     identification and proposed procedure were verified by 

                     the physician, the nurse and the anesthetist in the 

                     procedure room. Mental Status Examination: alert and 

                     oriented. Airway Examination: normal oropharyngeal airway 

                     and neck mobility. Respiratory Examination: clear to 

                     auscultation. CV Examination: normal. Prophylactic 

                     Antibiotics: The patient does not require prophylactic 

                     antibiotics. Prior Anticoagulants: The patient has taken 

                     no previous anticoagulant or antiplatelet agents. ASA 

                     Grade Assessment: III - A patient with severe systemic 

                     disease. After reviewing the risks and benefits, the 

                     patient was deemed in satisfactory condition to undergo 

                     the procedure. The anesthesia plan was to use monitored 

                     anesthesia care (MAC). Immediately prior to 

                     administration of medications, the patient was 

                     re-assessed for adequacy to receive sedatives. The heart 

                     rate, respiratory rate, oxygen saturations, blood 

                     pressure, adequacy of pulmonary ventilation, and response 

                     to care were monitored throughout the procedure. The 

                     physical status of the patient was re-assessed after the 

                     procedure.

                     The Colonoscope was introduced through the anus and 

                     advanced to the terminal ileum, with identification of 

                     the appendiceal orifice and IC valve. The colonoscopy was 

                     performed without difficulty. The patient tolerated the 

                     procedure well. The quality of the bowel preparation was 

                     good. The terminal ileum, ileocecal valve, appendiceal 

                     orifice, and rectum were photographed. Scope insertion 

                     time was 3 minutes. Scope withdrawal time was 12 minutes. 

                     The total duration of the procedure was 19 minutes.

                                                                                

Findings:

     The perianal and digital rectal examinations were normal.

     The terminal ileum appeared normal.

     A 4 mm polyp was found in the ascending colon. The polyp was sessile. The 

     polyp was removed with a cold biopsy forceps. Resection and retrieval 

     were complete. Verification of patient identification for the specimen 

     was done by the physician and nurse using the patient's name, birth date 

     and medical record number. Estimated blood loss was minimal.

     One medium-sized localized angioectasia with bleeding was found in the 

     transverse colon. Area was successfully injected with 8 mL of a 1:10,000 

     solution of epinephrine for drug delivery. Coagulation for hemostasis 

     using argon plasma at 0.8 liters/minute and 20 avila was successful. For 

     hemostasis, two hemostatic clips were successfully placed. There was no 

     bleeding at the end of the procedure.

     External and internal hemorrhoids were found during retroflexion. The 

     hemorrhoids were medium-sized.

                                                                                

Impression:          - The examined portion of the ileum was normal.

                     - One 4 mm polyp in the ascending colon, removed with a 

                     cold biopsy forceps. Resected and retrieved.

                     - One bleeding colonic angioectasia. Injected. Treated 

                     with argon plasma coagulation (APC). Clips were placed.

                     - External and internal hemorrhoids.

Recommendation:      - [facility].

                     - Written discharge instructions were provided to the 

                     patient.

                     - Low sodium diet today.

                     - Continue present medications.

                     - Await pathology results.

                     - Preparation H ointment: Apply externally daily for 7 

                     days.

                     - Repeat colonoscopy in 1 year for surveillance based on 

                     pathology results.

                     - Return to GI clinic. Please call [phone] to make 

                     follow up appointment in 2 months.

                     - Return to primary care physician.

                                                                                

 

Gian Charles MD

_______________________

Gian Charles MD

10/4/2017 8:05:35 PM

This report has been signed electronically.

Number of Addenda: 0

 

Note Initiated On: 10/4/2017 6:34 PM

Estimated Blood Loss:

     Estimated blood loss was minimal.

## 2017-10-04 NOTE — IPN
DATE:   10/04/2017

 

Kristie seen today prior to going for an esophagogastroduodenoscopy (EGD) and

colonoscopy.  She is getting some blood product beforehand.  No active bleeding.

She is alert and conversant, apprehensive about the procedure.

 

PHYSICAL EXAMINATION:  145/69.  Vital signs stable.  Lungs clear.  Heart regular

rate and rhythm.  Abdomen soft.  She has ascites, trace peripheral edema.

 

LABORATORIES:  Hemoglobin is down to 7.7 and platelets are 77.  Sodium 141,

potassium is down 3.1, BUN 37, creatinine 2.0, glucose 96, albumin is 2.5.

Yesterday's serum ascites albumin gradient was calculated at 1.4, suggesting

portal hypertension.

 

IMPRESSION:

1.  Gastrointestinal (GI) bleed.  Being transfused again, no sign of active

bleeding, upper and lower endoscopy planned for today by Dr. Charles.

 

2.  Cirrhosis.  Appreciate Dr. Charles's input.  I reviewed his consultation.

EGD to evaluate for varices, as well as colonoscopy planned.  Advised him 5 day

antibiotic.  Discharge home on Cipro to prevent spontaneous bacterial peritonitis

after GI bleed.

 

He will be given her some albumin.  I will ask for him to order that as contained

in his recommendations.

 

3.  Chronic kidney disease with mild acute kidney injury.  Creatinine is back to

her baseline of around 2.0.

 

4.  Hypertensive heart disease.  Blood pressure is under good control.

 

5. Chronic obstructive pulmonary disease (COPD), stable, asymptomatic.

 

6.  Hyperlipidemia.  I stopped her statin.  She was on atorvastatin.  I stopped

this in the face of her cirrhosis.

 

She would like to go home today.  I think it is more likely that she will be

ready for discharge tomorrow.  I will put in for a home safety evaluation

tomorrow.

## 2017-10-06 NOTE — DSES
DATE OF ADMISSION:  10/03/2017

DATE OF DISCHARGE:  10/05/2017

 

PRIMARY CARE PHYSICIAN:  Dr. Jazlyn Kc

 

ATTENDING PHYSICIAN:  Dr. Rohit Cornell

 

CONSULTANTS:  Dr. Charles

 

HISTORY OF PRESENT ILLNESS:  Kristie Jarrett is a 66-year-old female patient with a

history of liver cirrhosis that presented to the emergency room (ER) with

complaint of rectal bleeding.  In the ER, patient had a CT of the abdomen and

pelvis that showed severe stable hepatosplenomegaly.  Nodularity in the liver

suggests severe stable sclerosis.  Large amount of abdominal and pelvic ascites.

Diffuse atherosclerosis.  No evidence of aortic aneurysm.  No evidence of

hydronephrosis or nephrolithiasis.  No obstructive or inflammatory bowel changes.

Stable moderately severe degenerative disc disease at L2-3.

 

Patient was admitted, and gastroenterology was consulted.  Patient did require 1

unit transfusion during the course of her admission.  She did go for

paracentesis, and 2150 mL of clear savanna fluid was withdrawn.  Patient was seen

by gastroenterology.  It was arranged to have upper and lower endoscopy done.

She was placed on ceftriaxone during the course of hospitalization.  Dr. Charles, the gastroenterologist, advised that she is discharged home on oral

Cipro for an additional 5 days to prevent spontaneous bacterial peritonitis.

Patient had upper and lower endoscopy done.  Colonoscopy showed a 4 mm polyp in

the ascending colon.  Pathology is pending.  Also, Dr. Charles noted one

bleeding colonic angioectasia that was injected and treated with argon plasma

coagulation and clips were placed.  Dr. Charles recommends followup with him.

Patient also had upper endoscopy with Dr. Charles, who noted esophagitis with

no bleeding in the lower third of the esophagus.  He recommends followup with him

for repeat endoscopy.  Patient will also need to followup with Dr. Charles for

her cirrhosis and ascites.

 

By day of discharge, patient was doing better and reported no further bleeding.

Her aspirin was held during hospitalization and will continue to be held.  She is

started back up on her home medications.  Her creatinine is back to baseline

around 2.  Patient does have a history of chronic kidney disease.  She will need

to followup with her primary care provider (PCP), Dr. Jazlyn Kc, next week and

should followup with Dr. Charles in a couple weeks as well.

 

PHYSICAL EXAM:

Vitals:  Temperature 94.0, pulse 95, respiratory rate 18, blood pressure is

116/70, pulse oximetry 97% on room air.

General:  Patient is alert and oriented times three.  No acute distress.  No

respiratory distress.

Chest:  Clear to auscultation bilaterally.

Heart:  Regular rate and rhythm.

Abdomen:  Obese, soft, nontender.  No rebound or guarding.

Extremities:  No edema.

 

LABS:  WBC 3.6, hemoglobin 8.8, hematocrit 28.0, platelets 84.  Sodium 142.

Potassium was 2.9, but patient did get a dose of potassium 40 mEq this morning

and will get another dose of potassium 40 mEq before she leaves.  She is on home

potassium that will be restarted.  Chloride 106, carbon dioxide 24, BUN 35,

creatinine 2.05, glucose 192, calcium 7.6, magnesium 1.9, total bilirubin 0.3,

AST 24, ALT 22, alkaline phosphatase 118, total protein 5.9, albumin 2.5.  PT

15.9, INR 1.24.

 

MEDICATIONS:

- Cipro 500 mg by mouth twice a day times 5 days

- albuterol puffer two puffs as needed shortness of breath

- DuoNebs as needed for shortness of breath

- amlodipine 10 mg daily

- atorvastatin 40 mg by mouth daily

- calcium carbonate 600 mg by mouth twice a day

- carvedilol 25 mg by mouth twice a day

- eplerenone 25 mg by mouth daily

- vitamin D 50,000 units monthly

- ferrous sulfate 325 mg by mouth twice a day

- folic acid 1 mg daily

- NovoLog per sliding scale

- Keppra 1000 mg by mouth twice a day

- nitroglycerin sublingual as needed for chest pain

- nystatin topically twice a day to affected area

- omeprazole 40 mg by mouth twice a day

- potassium chloride 20 mEq by mouth three times a day

- Advair 250/50 one puff twice a day

- Spiriva one daily

- topiramate 50 mg by mouth twice a day

- torsemide 100 mg by mouth daily and 150 mg by mouth at nighttime

- Toujeo 18 units subcutaneously twice a day

- Chantix as directed

 

DISCHARGE INSTRUCTIONS:  Followup with Dr. Kc next week.  Followup with Dr. Charles in 1-2 weeks.  Diet is carbohydrate-consistent, low sodium.  Activity

as tolerated.

 

DISCHARGE DIAGNOSES:

Gastrointestinal (GI) bleed.

Cirrhosis.

Chronic kidney disease with acute kidney injury.

Hypertensive heart disease.

Chronic obstructive pulmonary disease (COPD).

Hyperlipidemia.

Esophagitis.

Hypertension.

Congestive heart failure.

Sleep apnea.

Thrombocytopenia.

Diabetes mellitus type 2.

 

Edited:  fidel 10/06/2017 1128

## 2017-10-11 NOTE — REP
Duplex extremity venous ultrasound: Bilateral lower extremities per

 

History:  Right bilateral swelling and leg pain.  Question DVT.

 

Findings:  The deep veins are anechoic and fully compressible from the groin to

the popliteal fossa in the left and right lower extremity.  Color flow imaging is

homogeneous.  Spectral Doppler interrogation demonstrates intact respiratory

variation in flow and normal manual augmentation of flow.  There is no evidence

of deep vein thrombosis. There is a 3.6 x 1.3 x 1.5 cm Baker's cyst in the right

popliteal soft tissues.

 

Impression:

 

Negative bilateral lower extremity duplex venous ultrasound.  No evidence of deep

vein thrombosis.  Small Baker's cyst in the right popliteal soft tissues.

 

 

Signed by

Jhonny Au MD 10/11/2017 03:42 P

## 2017-10-16 NOTE — REPUSA
CT of the abdomen and pelvis without contrast

Clinical statement: Pain. Status post colonoscopy.

Technique: Multiple axial CT images were obtained from the base of the lungs to the floor of the pelv
is utilizing 5 mm axial slices after administration of oral contrast. Coronal and sagittal reconstruc
tions were also obtained.

Comparison: 10/2/2017..

Findings:

Chest: The visualized lung bases are clear.

Abdomen: The kidneys are normal in size bilaterally. There is no evidence of hydronephrosis or nephro
lithiasis. The liver is enlarged, with nodularity suggesting cirrhosis. The spleen, pancreas, gallbla
dder and adrenal glands are unremarkable. The aorta demonstrates normal caliber and contour. There is
 no abdominal lymphadenopathy. Moderate amount of abdominal ascites is stable.

Pelvis: Moderate amount of stool fills the colon. The bowel is otherwise unremarkable, with no obstru
ctive or inflammatory changes. The urinary bladder is within normal limits. There is no pelvic lympha
denopathy or ascites. The other pelvic structures appear unremarkable.

Bones: There are no suspicious osseous abnormalities seen. Left hip arthroplasty is in place. Multile
himanshu degenerative disc disease is stable.

Impression:

1. Stable sclerotic liver, with hepatomegaly.

2. Moderate amount of abdominal ascites.

3. Moderate constipation. No obstructive or inflammatory bowel changes.

4. Spondylosis of the spine is stable.

5. Overall, no significant interval change.

     Electronically signed by AFSHAN REDD MD on 10/16/2017 10:24:56 PM ET

## 2017-11-20 NOTE — REP
PARACENTESIS:

 

The procedure was performed by STEVEN Garcia under the direct

supervision of Dr. Duque.

 

The procedure along with its benefits and complications were discussed with the

patient prior to the examination. Informed consent was obtained both verbally and

written. The patient was identified in the ultrasound suite and placed in a

supine position. The bilateral lower quadrants were interrogated with ultrasound.

The right lower quadrant demonstrated a moderate sized fluid collection. An

appropriate site was chosen for needle entry and this area was marked prepped and

draped in the usual sterile fashion. A procedural time out was performed to

ensure that the correct patient site and procedure were being performed. Local

infiltrative anesthesia was achieved using 1% Xylocaine. An 8-Pakistani centesis

catheter was advanced through the abdominal wall and under continuous negative

pressure until serous fluid was aspirated. The needle was removed and the

catheter was advanced. Approximately 4100 mm of dark red fluid was removed. The

catheter was then removed and hemostasis was achieved and a soft dressing was

applied to the entry site. The patient tolerated the procedure well and had no

immediate complications.

 

IMPRESSION:

 

Uncomplicated right paracentesis yielding 4,100 mL of dark red fluid.

 

 

Reviewed by

STEVEN Liao 11/21/2017 05:27 PEdited and Signed by

Lewis Duque MD 11/22/2017 01:49 P

## 2017-12-06 NOTE — REP
Ultrasound-guided paracentesis

 

The procedure was performed under the direct supervision of Dr. Sandoval.

 

The risks and benefits of the procedure were explained to the patient and

informed consent was obtained.  The largest pocket of fluid was localized in the

left flank using ultrasound guidance.  The skin was prepped and draped in a

sterile fashion.  1% lidocaine was used as a local anesthetic.  An 8-[place] multi

side-hole catheter was inserted using trocar technique.  4500 ml of low viscosity

red colored fluid was withdrawn and discarded.

 

The the patient tolerated the procedure well and there were no immediate

complications.  After the appropriate amount of monitored convalescence the

patient was discharged from the department.

 

 

Reviewed by

STEVEN Nice 12/06/2017 03:57 PSigned by

Moises Sandoval MD 12/06/2017 07:26 P